# Patient Record
Sex: MALE | Race: WHITE | NOT HISPANIC OR LATINO
[De-identification: names, ages, dates, MRNs, and addresses within clinical notes are randomized per-mention and may not be internally consistent; named-entity substitution may affect disease eponyms.]

---

## 2017-03-28 ENCOUNTER — APPOINTMENT (OUTPATIENT)
Dept: TRANSPLANT | Facility: CLINIC | Age: 57
End: 2017-03-28

## 2017-03-28 ENCOUNTER — OUTPATIENT (OUTPATIENT)
Dept: OUTPATIENT SERVICES | Facility: HOSPITAL | Age: 57
LOS: 1 days | End: 2017-03-28

## 2017-03-28 ENCOUNTER — APPOINTMENT (OUTPATIENT)
Dept: NEPHROLOGY | Facility: CLINIC | Age: 57
End: 2017-03-28

## 2017-03-28 VITALS
HEIGHT: 66.25 IN | BODY MASS INDEX: 30.76 KG/M2 | DIASTOLIC BLOOD PRESSURE: 98 MMHG | SYSTOLIC BLOOD PRESSURE: 162 MMHG | RESPIRATION RATE: 18 BRPM | TEMPERATURE: 98 F | OXYGEN SATURATION: 98 % | HEART RATE: 86 BPM | WEIGHT: 191.4 LBS

## 2017-03-28 DIAGNOSIS — Z82.49 FAMILY HISTORY OF ISCHEMIC HEART DISEASE AND OTHER DISEASES OF THE CIRCULATORY SYSTEM: ICD-10-CM

## 2017-03-28 DIAGNOSIS — N18.6 END STAGE RENAL DISEASE: ICD-10-CM

## 2017-03-28 DIAGNOSIS — T56.891A TOXIC EFFECT OF OTHER METALS, ACCIDENTAL (UNINTENTIONAL), INITIAL ENCOUNTER: ICD-10-CM

## 2017-03-28 DIAGNOSIS — Z84.1 FAMILY HISTORY OF DISORDERS OF KIDNEY AND URETER: ICD-10-CM

## 2017-03-28 DIAGNOSIS — M25.519 PAIN IN UNSPECIFIED SHOULDER: ICD-10-CM

## 2017-03-28 DIAGNOSIS — E03.9 HYPOTHYROIDISM, UNSPECIFIED: ICD-10-CM

## 2017-03-28 DIAGNOSIS — Z87.81 PERSONAL HISTORY OF (HEALED) TRAUMATIC FRACTURE: ICD-10-CM

## 2017-03-28 DIAGNOSIS — Z81.8 FAMILY HISTORY OF OTHER MENTAL AND BEHAVIORAL DISORDERS: ICD-10-CM

## 2017-03-28 DIAGNOSIS — R44.3 HALLUCINATIONS, UNSPECIFIED: ICD-10-CM

## 2017-03-28 RX ORDER — AZITHROMYCIN 250 MG/1
250 TABLET, FILM COATED ORAL
Qty: 6 | Refills: 0 | Status: DISCONTINUED | COMMUNITY
Start: 2017-03-15

## 2017-03-28 RX ORDER — ARIPIPRAZOLE 20 MG/1
20 TABLET ORAL DAILY
Refills: 0 | Status: ACTIVE | COMMUNITY

## 2017-03-28 RX ORDER — CINACALCET HYDROCHLORIDE 90 MG/1
90 TABLET, COATED ORAL
Refills: 0 | Status: ACTIVE | COMMUNITY

## 2017-03-28 RX ORDER — CINACALCET HYDROCHLORIDE 60 MG/1
60 TABLET, COATED ORAL
Qty: 90 | Refills: 0 | Status: DISCONTINUED | COMMUNITY
Start: 2017-01-09

## 2017-03-28 RX ORDER — METOPROLOL SUCCINATE 50 MG/1
50 TABLET, EXTENDED RELEASE ORAL
Qty: 120 | Refills: 0 | Status: DISCONTINUED | COMMUNITY
Start: 2016-12-06

## 2017-03-28 RX ORDER — CHLORPROMAZINE HYDROCHLORIDE 50 MG/1
50 TABLET, SUGAR COATED ORAL
Qty: 630 | Refills: 0 | Status: DISCONTINUED | COMMUNITY
Start: 2016-10-07

## 2017-03-28 RX ORDER — MINERAL/VITAMIN SUPPLEMENT 66; 40; 20; 10; 10; 1.7; 1.5; 1; .3; .006 MG/1; MG/1; MG/1; MG/1; MG/1; MG/1; MG/1; MG/1; MG/1; MG/1
TABLET, COATED ORAL TWICE DAILY
Refills: 0 | Status: ACTIVE | COMMUNITY

## 2017-03-28 RX ORDER — CINACALCET HYDROCHLORIDE 30 MG/1
30 TABLET, COATED ORAL
Qty: 30 | Refills: 0 | Status: DISCONTINUED | COMMUNITY
Start: 2016-11-14

## 2017-03-28 RX ORDER — SODIUM BICARBONATE 650 MG/1
650 TABLET ORAL
Qty: 540 | Refills: 3 | Status: ACTIVE | COMMUNITY

## 2017-04-12 ENCOUNTER — APPOINTMENT (OUTPATIENT)
Dept: ULTRASOUND IMAGING | Facility: CLINIC | Age: 57
End: 2017-04-12

## 2017-04-12 ENCOUNTER — APPOINTMENT (OUTPATIENT)
Dept: RADIOLOGY | Facility: CLINIC | Age: 57
End: 2017-04-12

## 2017-04-19 ENCOUNTER — NON-APPOINTMENT (OUTPATIENT)
Age: 57
End: 2017-04-19

## 2017-04-19 ENCOUNTER — APPOINTMENT (OUTPATIENT)
Dept: CARDIOLOGY | Facility: CLINIC | Age: 57
End: 2017-04-19

## 2017-04-19 VITALS
HEIGHT: 66.25 IN | WEIGHT: 191 LBS | RESPIRATION RATE: 18 BRPM | DIASTOLIC BLOOD PRESSURE: 105 MMHG | HEART RATE: 79 BPM | BODY MASS INDEX: 30.7 KG/M2 | SYSTOLIC BLOOD PRESSURE: 172 MMHG | OXYGEN SATURATION: 98 %

## 2017-05-09 ENCOUNTER — APPOINTMENT (OUTPATIENT)
Dept: CV DIAGNOSITCS | Facility: HOSPITAL | Age: 57
End: 2017-05-09

## 2017-05-09 ENCOUNTER — OUTPATIENT (OUTPATIENT)
Dept: OUTPATIENT SERVICES | Facility: HOSPITAL | Age: 57
LOS: 1 days | End: 2017-05-09
Payer: COMMERCIAL

## 2017-05-09 ENCOUNTER — APPOINTMENT (OUTPATIENT)
Dept: CV DIAGNOSTICS | Facility: HOSPITAL | Age: 57
End: 2017-05-09

## 2017-05-09 DIAGNOSIS — N18.9 CHRONIC KIDNEY DISEASE, UNSPECIFIED: ICD-10-CM

## 2017-05-09 DIAGNOSIS — I10 ESSENTIAL (PRIMARY) HYPERTENSION: ICD-10-CM

## 2017-05-09 PROCEDURE — 93018 CV STRESS TEST I&R ONLY: CPT | Mod: GC

## 2017-05-09 PROCEDURE — 93016 CV STRESS TEST SUPVJ ONLY: CPT | Mod: GC

## 2017-05-09 PROCEDURE — 78452 HT MUSCLE IMAGE SPECT MULT: CPT | Mod: 26

## 2017-05-09 PROCEDURE — 93306 TTE W/DOPPLER COMPLETE: CPT | Mod: 26

## 2017-05-17 ENCOUNTER — APPOINTMENT (OUTPATIENT)
Dept: PSYCHIATRY | Facility: CLINIC | Age: 57
End: 2017-05-17

## 2017-07-18 ENCOUNTER — APPOINTMENT (OUTPATIENT)
Dept: ULTRASOUND IMAGING | Facility: CLINIC | Age: 57
End: 2017-07-18

## 2017-07-18 ENCOUNTER — APPOINTMENT (OUTPATIENT)
Dept: RADIOLOGY | Facility: CLINIC | Age: 57
End: 2017-07-18

## 2017-08-01 ENCOUNTER — OTHER (OUTPATIENT)
Age: 57
End: 2017-08-01

## 2017-08-02 ENCOUNTER — OTHER (OUTPATIENT)
Age: 57
End: 2017-08-02

## 2017-11-09 ENCOUNTER — CHART COPY (OUTPATIENT)
Age: 57
End: 2017-11-09

## 2017-11-09 ENCOUNTER — LABORATORY RESULT (OUTPATIENT)
Age: 57
End: 2017-11-09

## 2017-12-11 ENCOUNTER — APPOINTMENT (OUTPATIENT)
Dept: NEPHROLOGY | Facility: CLINIC | Age: 57
End: 2017-12-11
Payer: MEDICARE

## 2017-12-11 ENCOUNTER — LABORATORY RESULT (OUTPATIENT)
Age: 57
End: 2017-12-11

## 2017-12-11 VITALS — HEART RATE: 90 BPM | DIASTOLIC BLOOD PRESSURE: 98 MMHG | SYSTOLIC BLOOD PRESSURE: 170 MMHG

## 2017-12-11 VITALS — HEIGHT: 67 IN | BODY MASS INDEX: 30.61 KG/M2 | WEIGHT: 195 LBS

## 2017-12-11 DIAGNOSIS — R80.9 PROTEINURIA, UNSPECIFIED: ICD-10-CM

## 2017-12-11 DIAGNOSIS — D63.1 PERSONAL HISTORY OF DISEASES OF THE BLOOD AND BLOOD-FORMING ORGANS AND CERTAIN DISORDERS INVOLVING THE IMMUNE MECHANISM: ICD-10-CM

## 2017-12-11 DIAGNOSIS — Z86.2 PERSONAL HISTORY OF DISEASES OF THE BLOOD AND BLOOD-FORMING ORGANS AND CERTAIN DISORDERS INVOLVING THE IMMUNE MECHANISM: ICD-10-CM

## 2017-12-11 DIAGNOSIS — R04.0 EPISTAXIS: ICD-10-CM

## 2017-12-11 DIAGNOSIS — E21.3 HYPERPARATHYROIDISM, UNSPECIFIED: ICD-10-CM

## 2017-12-11 PROCEDURE — 99205 OFFICE O/P NEW HI 60 MIN: CPT | Mod: 25

## 2017-12-11 PROCEDURE — 36415 COLL VENOUS BLD VENIPUNCTURE: CPT

## 2017-12-11 RX ORDER — AMLODIPINE BESYLATE 5 MG/1
5 TABLET ORAL TWICE DAILY
Qty: 180 | Refills: 3 | Status: ACTIVE | COMMUNITY
Start: 1900-01-01 | End: 1900-01-01

## 2017-12-11 RX ORDER — CALCITRIOL 0.25 UG/1
0.25 CAPSULE, LIQUID FILLED ORAL
Qty: 180 | Refills: 3 | Status: ACTIVE | COMMUNITY

## 2017-12-13 ENCOUNTER — RX RENEWAL (OUTPATIENT)
Age: 57
End: 2017-12-13

## 2017-12-13 DIAGNOSIS — E87.5 HYPERKALEMIA: ICD-10-CM

## 2017-12-13 PROBLEM — R80.9 PROTEINURIA: Status: ACTIVE | Noted: 2017-12-13

## 2017-12-13 PROBLEM — Z86.2 HISTORY OF ANEMIA DUE TO CHRONIC KIDNEY DISEASE: Status: RESOLVED | Noted: 2017-12-11 | Resolved: 2017-12-13

## 2017-12-13 PROBLEM — R04.0 EPISTAXIS: Status: ACTIVE | Noted: 2017-12-13

## 2017-12-13 LAB
25(OH)D3 SERPL-MCNC: 19.5 NG/ML
ALBUMIN SERPL ELPH-MCNC: 3.7 G/DL
ALP BLD-CCNC: 106 U/L
ALT SERPL-CCNC: 37 U/L
ANION GAP SERPL CALC-SCNC: 18 MMOL/L
APPEARANCE: CLEAR
AST SERPL-CCNC: 24 U/L
BACTERIA: NEGATIVE
BASOPHILS # BLD AUTO: 0.05 K/UL
BASOPHILS NFR BLD AUTO: 0.8 %
BILIRUB SERPL-MCNC: 0.4 MG/DL
BILIRUBIN URINE: NEGATIVE
BLOOD URINE: ABNORMAL
BUN SERPL-MCNC: 83 MG/DL
CALCIUM SERPL-MCNC: 9.1 MG/DL
CALCIUM SERPL-MCNC: 9.1 MG/DL
CHLORIDE SERPL-SCNC: 103 MMOL/L
CHOLEST SERPL-MCNC: 168 MG/DL
CHOLEST/HDLC SERPL: 3.1 RATIO
CO2 SERPL-SCNC: 17 MMOL/L
COLOR: YELLOW
CREAT SERPL-MCNC: 5.59 MG/DL
CREAT SPEC-SCNC: 24 MG/DL
EOSINOPHIL # BLD AUTO: 0.2 K/UL
EOSINOPHIL NFR BLD AUTO: 3.2
FERRITIN SERPL-MCNC: 244 NG/ML
FOLATE SERPL-MCNC: >20 NG/ML
GLUCOSE QUALITATIVE U: NEGATIVE MG/DL
GLUCOSE SERPL-MCNC: 91 MG/DL
HBA1C MFR BLD HPLC: 4.8 %
HBV CORE IGG+IGM SER QL: NONREACTIVE
HBV SURFACE AB SER QL: NONREACTIVE
HBV SURFACE AG SER QL: NONREACTIVE
HCT VFR BLD CALC: 23.3 %
HCV AB SER QL: NONREACTIVE
HCV S/CO RATIO: 0.11 S/CO
HDLC SERPL-MCNC: 54 MG/DL
HGB BLD-MCNC: 8 G/DL
HYALINE CASTS: 0 /LPF
IRON SATN MFR SERPL: 22 %
IRON SERPL-MCNC: 47 UG/DL
KETONES URINE: NEGATIVE
LDLC SERPL CALC-MCNC: 95 MG/DL
LEUKOCYTE ESTERASE URINE: NEGATIVE
LYMPHOCYTES # BLD AUTO: 0.95 K/UL
LYMPHOCYTES NFR BLD AUTO: 15.3 %
MAN DIFF?: NORMAL
MCHC RBC-ENTMCNC: 30.2 PG
MCHC RBC-ENTMCNC: 34.3 GM/DL
MCV RBC AUTO: 87.9 FL
MICROALBUMIN 24H UR DL<=1MG/L-MCNC: 83.4 MG/DL
MICROALBUMIN/CREAT 24H UR-RTO: 3475 MG/G
MICROSCOPIC-UA: NORMAL
MONOCYTES # BLD AUTO: 0.25 K/UL
MONOCYTES NFR BLD AUTO: 4 %
NEUTROPHILS # BLD AUTO: 4.76 K/UL
NEUTROPHILS NFR BLD AUTO: 76.6 %
NITRITE URINE: NEGATIVE
PARATHYROID HORMONE INTACT: 860 PG/ML
PH URINE: 6.5
PHOSPHATE SERPL-MCNC: 5.9 MG/DL
PLATELET # BLD AUTO: 129 K/UL
POTASSIUM SERPL-SCNC: 5.5 MMOL/L
PROT SERPL-MCNC: 8.1 G/DL
PROTEIN URINE: 100 MG/DL
RBC # BLD: 2.65 M/UL
RBC # FLD: 14.3 %
RED BLOOD CELLS URINE: 1 /HPF
SODIUM SERPL-SCNC: 138 MMOL/L
SPECIFIC GRAVITY URINE: 1.01
SQUAMOUS EPITHELIAL CELLS: 0 /HPF
TIBC SERPL-MCNC: 211 UG/DL
TRIGL SERPL-MCNC: 94 MG/DL
UIBC SERPL-MCNC: 164 UG/DL
URATE SERPL-MCNC: 5.3 MG/DL
UROBILINOGEN URINE: NEGATIVE MG/DL
VIT B12 SERPL-MCNC: 1851 PG/ML
WBC # FLD AUTO: 6.21 K/UL
WHITE BLOOD CELLS URINE: 2 /HPF

## 2017-12-18 ENCOUNTER — APPOINTMENT (OUTPATIENT)
Dept: NEPHROLOGY | Facility: CLINIC | Age: 57
End: 2017-12-18
Payer: MEDICARE

## 2017-12-18 VITALS — SYSTOLIC BLOOD PRESSURE: 175 MMHG | DIASTOLIC BLOOD PRESSURE: 101 MMHG

## 2017-12-18 VITALS — DIASTOLIC BLOOD PRESSURE: 107 MMHG | SYSTOLIC BLOOD PRESSURE: 165 MMHG

## 2017-12-18 VITALS
WEIGHT: 198 LBS | OXYGEN SATURATION: 100 % | DIASTOLIC BLOOD PRESSURE: 113 MMHG | SYSTOLIC BLOOD PRESSURE: 118 MMHG | HEART RATE: 92 BPM | HEIGHT: 67 IN | BODY MASS INDEX: 31.08 KG/M2

## 2017-12-18 DIAGNOSIS — N18.9 CHRONIC KIDNEY DISEASE, UNSPECIFIED: ICD-10-CM

## 2017-12-18 PROCEDURE — 96372 THER/PROPH/DIAG INJ SC/IM: CPT

## 2017-12-18 PROCEDURE — 99214 OFFICE O/P EST MOD 30 MIN: CPT | Mod: 25

## 2017-12-18 RX ORDER — AMLODIPINE BESYLATE 10 MG/1
10 TABLET ORAL
Qty: 90 | Refills: 0 | Status: DISCONTINUED | COMMUNITY
Start: 2017-11-20

## 2017-12-18 RX ORDER — DARBEPOETIN ALFA 100 UG/ML
100 SOLUTION INTRAVENOUS; SUBCUTANEOUS
Refills: 0 | Status: COMPLETED | OUTPATIENT
Start: 2017-12-18

## 2017-12-18 RX ORDER — BENZTROPINE MESYLATE 1 MG/1
1 TABLET ORAL
Qty: 60 | Refills: 0 | Status: ACTIVE | COMMUNITY
Start: 2017-09-25

## 2017-12-18 RX ADMIN — DARBEPOETIN ALFA 0 MCG/ML: 100 SOLUTION INTRAVENOUS; SUBCUTANEOUS at 00:00

## 2017-12-19 PROBLEM — N18.9 CKD (CHRONIC KIDNEY DISEASE): Noted: 2017-03-28

## 2018-01-30 ENCOUNTER — APPOINTMENT (OUTPATIENT)
Dept: NEPHROLOGY | Facility: CLINIC | Age: 58
End: 2018-01-30
Payer: MEDICARE

## 2018-01-30 VITALS
DIASTOLIC BLOOD PRESSURE: 93 MMHG | BODY MASS INDEX: 31.55 KG/M2 | OXYGEN SATURATION: 99 % | SYSTOLIC BLOOD PRESSURE: 159 MMHG | HEART RATE: 78 BPM | WEIGHT: 201 LBS | HEIGHT: 67 IN

## 2018-01-30 VITALS — HEART RATE: 70 BPM | SYSTOLIC BLOOD PRESSURE: 150 MMHG | DIASTOLIC BLOOD PRESSURE: 84 MMHG

## 2018-01-30 DIAGNOSIS — D63.1 CHRONIC KIDNEY DISEASE, STAGE 5: ICD-10-CM

## 2018-01-30 DIAGNOSIS — D63.1 ANEMIA IN CHRONIC KIDNEY DISEASE: ICD-10-CM

## 2018-01-30 DIAGNOSIS — N18.5 CHRONIC KIDNEY DISEASE, STAGE 5: ICD-10-CM

## 2018-01-30 PROCEDURE — 99214 OFFICE O/P EST MOD 30 MIN: CPT | Mod: 25

## 2018-01-30 PROCEDURE — G0010: CPT

## 2018-01-30 PROCEDURE — 96372 THER/PROPH/DIAG INJ SC/IM: CPT

## 2018-01-30 PROCEDURE — 90740 HEPB VACC 3 DOSE IMMUNSUP IM: CPT

## 2018-01-30 RX ORDER — DARBEPOETIN ALFA 100 UG/ML
100 SOLUTION INTRAVENOUS; SUBCUTANEOUS
Refills: 0 | Status: COMPLETED | OUTPATIENT
Start: 2018-01-30

## 2018-01-30 RX ADMIN — DARBEPOETIN ALFA 0 MCG/ML: 100 SOLUTION INTRAVENOUS; SUBCUTANEOUS at 00:00

## 2018-02-06 ENCOUNTER — APPOINTMENT (OUTPATIENT)
Dept: TRANSPLANT | Facility: CLINIC | Age: 58
End: 2018-02-06

## 2018-02-08 ENCOUNTER — MEDICATION RENEWAL (OUTPATIENT)
Age: 58
End: 2018-02-08

## 2018-02-08 RX ORDER — DOXAZOSIN 8 MG/1
8 TABLET ORAL DAILY
Qty: 90 | Refills: 3 | Status: ACTIVE | COMMUNITY
Start: 1900-01-01 | End: 1900-01-01

## 2018-02-09 ENCOUNTER — INPATIENT (INPATIENT)
Facility: HOSPITAL | Age: 58
LOS: 5 days | Discharge: HOME | DRG: 683 | End: 2018-02-15
Attending: INTERNAL MEDICINE | Admitting: INTERNAL MEDICINE
Payer: MEDICARE

## 2018-02-09 VITALS
WEIGHT: 199.96 LBS | HEIGHT: 68 IN | DIASTOLIC BLOOD PRESSURE: 94 MMHG | TEMPERATURE: 98 F | OXYGEN SATURATION: 100 % | HEART RATE: 74 BPM | SYSTOLIC BLOOD PRESSURE: 154 MMHG | RESPIRATION RATE: 16 BRPM

## 2018-02-09 DIAGNOSIS — F31.9 BIPOLAR DISORDER, UNSPECIFIED: ICD-10-CM

## 2018-02-09 DIAGNOSIS — D69.6 THROMBOCYTOPENIA, UNSPECIFIED: ICD-10-CM

## 2018-02-09 DIAGNOSIS — E03.9 HYPOTHYROIDISM, UNSPECIFIED: ICD-10-CM

## 2018-02-09 DIAGNOSIS — Z29.9 ENCOUNTER FOR PROPHYLACTIC MEASURES, UNSPECIFIED: ICD-10-CM

## 2018-02-09 DIAGNOSIS — N18.9 CHRONIC KIDNEY DISEASE, UNSPECIFIED: ICD-10-CM

## 2018-02-09 DIAGNOSIS — D64.9 ANEMIA, UNSPECIFIED: ICD-10-CM

## 2018-02-09 DIAGNOSIS — R04.0 EPISTAXIS: ICD-10-CM

## 2018-02-09 DIAGNOSIS — R53.1 WEAKNESS: ICD-10-CM

## 2018-02-09 DIAGNOSIS — I10 ESSENTIAL (PRIMARY) HYPERTENSION: ICD-10-CM

## 2018-02-09 DIAGNOSIS — N18.5 CHRONIC KIDNEY DISEASE, STAGE 5: ICD-10-CM

## 2018-02-09 DIAGNOSIS — R74.0 NONSPECIFIC ELEVATION OF LEVELS OF TRANSAMINASE AND LACTIC ACID DEHYDROGENASE [LDH]: ICD-10-CM

## 2018-02-09 LAB
ALBUMIN SERPL ELPH-MCNC: 3.7 G/DL — SIGNIFICANT CHANGE UP (ref 3.3–5)
ALP SERPL-CCNC: 85 U/L — SIGNIFICANT CHANGE UP (ref 40–120)
ALT FLD-CCNC: 55 U/L RC — HIGH (ref 10–45)
ANION GAP SERPL CALC-SCNC: 18 MMOL/L — HIGH (ref 5–17)
APTT BLD: 35.8 SEC — SIGNIFICANT CHANGE UP (ref 27.5–37.4)
AST SERPL-CCNC: 51 U/L — HIGH (ref 10–40)
BASOPHILS # BLD AUTO: 0 K/UL — SIGNIFICANT CHANGE UP (ref 0–0.2)
BASOPHILS NFR BLD AUTO: 0.2 % — SIGNIFICANT CHANGE UP (ref 0–2)
BILIRUB SERPL-MCNC: 0.2 MG/DL — SIGNIFICANT CHANGE UP (ref 0.2–1.2)
BLD GP AB SCN SERPL QL: NEGATIVE — SIGNIFICANT CHANGE UP
BUN SERPL-MCNC: 100 MG/DL — HIGH (ref 7–23)
CALCIUM SERPL-MCNC: 8.5 MG/DL — SIGNIFICANT CHANGE UP (ref 8.4–10.5)
CHLORIDE SERPL-SCNC: 103 MMOL/L — SIGNIFICANT CHANGE UP (ref 96–108)
CO2 SERPL-SCNC: 17 MMOL/L — LOW (ref 22–31)
CREAT SERPL-MCNC: 7.35 MG/DL — HIGH (ref 0.5–1.3)
EOSINOPHIL # BLD AUTO: 0 K/UL — SIGNIFICANT CHANGE UP (ref 0–0.5)
EOSINOPHIL NFR BLD AUTO: 0.8 % — SIGNIFICANT CHANGE UP (ref 0–6)
GLUCOSE SERPL-MCNC: 94 MG/DL — SIGNIFICANT CHANGE UP (ref 70–99)
HCT VFR BLD CALC: 21.2 % — LOW (ref 39–50)
HCT VFR BLD CALC: 22.6 % — LOW (ref 39–50)
HGB BLD-MCNC: 7.4 G/DL — LOW (ref 13–17)
HGB BLD-MCNC: 8 G/DL — LOW (ref 13–17)
INR BLD: 1.15 RATIO — SIGNIFICANT CHANGE UP (ref 0.88–1.16)
LYMPHOCYTES # BLD AUTO: 0.8 K/UL — LOW (ref 1–3.3)
LYMPHOCYTES # BLD AUTO: 14.7 % — SIGNIFICANT CHANGE UP (ref 13–44)
MCHC RBC-ENTMCNC: 31.8 PG — SIGNIFICANT CHANGE UP (ref 27–34)
MCHC RBC-ENTMCNC: 32.5 PG — SIGNIFICANT CHANGE UP (ref 27–34)
MCHC RBC-ENTMCNC: 34.8 GM/DL — SIGNIFICANT CHANGE UP (ref 32–36)
MCHC RBC-ENTMCNC: 35.3 GM/DL — SIGNIFICANT CHANGE UP (ref 32–36)
MCV RBC AUTO: 91.5 FL — SIGNIFICANT CHANGE UP (ref 80–100)
MCV RBC AUTO: 92 FL — SIGNIFICANT CHANGE UP (ref 80–100)
MONOCYTES # BLD AUTO: 0.3 K/UL — SIGNIFICANT CHANGE UP (ref 0–0.9)
MONOCYTES NFR BLD AUTO: 4.9 % — SIGNIFICANT CHANGE UP (ref 2–14)
NEUTROPHILS # BLD AUTO: 4.1 K/UL — SIGNIFICANT CHANGE UP (ref 1.8–7.4)
NEUTROPHILS NFR BLD AUTO: 79.4 % — HIGH (ref 43–77)
PLAT MORPH BLD: NORMAL — SIGNIFICANT CHANGE UP
PLATELET # BLD AUTO: 108 K/UL — LOW (ref 150–400)
PLATELET # BLD AUTO: 88 K/UL — LOW (ref 150–400)
POTASSIUM SERPL-MCNC: 5.3 MMOL/L — SIGNIFICANT CHANGE UP (ref 3.5–5.3)
POTASSIUM SERPL-SCNC: 5.3 MMOL/L — SIGNIFICANT CHANGE UP (ref 3.5–5.3)
PROT SERPL-MCNC: 7.8 G/DL — SIGNIFICANT CHANGE UP (ref 6–8.3)
PROTHROM AB SERPL-ACNC: 12.5 SEC — SIGNIFICANT CHANGE UP (ref 9.8–12.7)
RBC # BLD: 2.31 M/UL — LOW (ref 4.2–5.8)
RBC # BLD: 2.45 M/UL — LOW (ref 4.2–5.8)
RBC # FLD: 15.1 % — HIGH (ref 10.3–14.5)
RBC # FLD: 15.6 % — HIGH (ref 10.3–14.5)
RBC BLD AUTO: NORMAL — SIGNIFICANT CHANGE UP
RH IG SCN BLD-IMP: POSITIVE — SIGNIFICANT CHANGE UP
RH IG SCN BLD-IMP: POSITIVE — SIGNIFICANT CHANGE UP
ROULEAUX BLD QL SMEAR: PRESENT — SIGNIFICANT CHANGE UP
SODIUM SERPL-SCNC: 138 MMOL/L — SIGNIFICANT CHANGE UP (ref 135–145)
WBC # BLD: 5.2 K/UL — SIGNIFICANT CHANGE UP (ref 3.8–10.5)
WBC # BLD: 5.4 K/UL — SIGNIFICANT CHANGE UP (ref 3.8–10.5)
WBC # FLD AUTO: 5.2 K/UL — SIGNIFICANT CHANGE UP (ref 3.8–10.5)
WBC # FLD AUTO: 5.4 K/UL — SIGNIFICANT CHANGE UP (ref 3.8–10.5)

## 2018-02-09 PROCEDURE — 99223 1ST HOSP IP/OBS HIGH 75: CPT

## 2018-02-09 PROCEDURE — 99222 1ST HOSP IP/OBS MODERATE 55: CPT

## 2018-02-09 PROCEDURE — 71045 X-RAY EXAM CHEST 1 VIEW: CPT | Mod: 26

## 2018-02-09 PROCEDURE — 99285 EMERGENCY DEPT VISIT HI MDM: CPT | Mod: 25,GC

## 2018-02-09 PROCEDURE — 93010 ELECTROCARDIOGRAM REPORT: CPT

## 2018-02-09 RX ORDER — CINACALCET 30 MG/1
90 TABLET, FILM COATED ORAL DAILY
Qty: 0 | Refills: 0 | Status: DISCONTINUED | OUTPATIENT
Start: 2018-02-09 | End: 2018-02-15

## 2018-02-09 RX ORDER — SODIUM CHLORIDE 9 MG/ML
3 INJECTION INTRAMUSCULAR; INTRAVENOUS; SUBCUTANEOUS EVERY 8 HOURS
Qty: 0 | Refills: 0 | Status: DISCONTINUED | OUTPATIENT
Start: 2018-02-09 | End: 2018-02-15

## 2018-02-09 RX ORDER — SODIUM BICARBONATE 1 MEQ/ML
1300 SYRINGE (ML) INTRAVENOUS
Qty: 0 | Refills: 0 | Status: DISCONTINUED | OUTPATIENT
Start: 2018-02-09 | End: 2018-02-15

## 2018-02-09 RX ORDER — DOXAZOSIN MESYLATE 4 MG
8 TABLET ORAL AT BEDTIME
Qty: 0 | Refills: 0 | Status: DISCONTINUED | OUTPATIENT
Start: 2018-02-09 | End: 2018-02-15

## 2018-02-09 RX ORDER — LEVOTHYROXINE SODIUM 125 MCG
75 TABLET ORAL DAILY
Qty: 0 | Refills: 0 | Status: DISCONTINUED | OUTPATIENT
Start: 2018-02-09 | End: 2018-02-15

## 2018-02-09 RX ORDER — LEVOTHYROXINE SODIUM 125 MCG
200 TABLET ORAL DAILY
Qty: 0 | Refills: 0 | Status: DISCONTINUED | OUTPATIENT
Start: 2018-02-09 | End: 2018-02-15

## 2018-02-09 RX ORDER — AMLODIPINE BESYLATE 2.5 MG/1
5 TABLET ORAL DAILY
Qty: 0 | Refills: 0 | Status: DISCONTINUED | OUTPATIENT
Start: 2018-02-09 | End: 2018-02-10

## 2018-02-09 RX ORDER — CHLORPROMAZINE HCL 10 MG
150 TABLET ORAL AT BEDTIME
Qty: 0 | Refills: 0 | Status: DISCONTINUED | OUTPATIENT
Start: 2018-02-09 | End: 2018-02-15

## 2018-02-09 RX ORDER — ARIPIPRAZOLE 15 MG/1
20 TABLET ORAL DAILY
Qty: 0 | Refills: 0 | Status: DISCONTINUED | OUTPATIENT
Start: 2018-02-09 | End: 2018-02-15

## 2018-02-09 RX ORDER — METOPROLOL TARTRATE 50 MG
200 TABLET ORAL DAILY
Qty: 0 | Refills: 0 | Status: DISCONTINUED | OUTPATIENT
Start: 2018-02-09 | End: 2018-02-11

## 2018-02-09 RX ORDER — ACETAMINOPHEN 500 MG
650 TABLET ORAL EVERY 6 HOURS
Qty: 0 | Refills: 0 | Status: DISCONTINUED | OUTPATIENT
Start: 2018-02-09 | End: 2018-02-15

## 2018-02-09 RX ORDER — CHLORPROMAZINE HCL 10 MG
100 TABLET ORAL
Qty: 0 | Refills: 0 | Status: DISCONTINUED | OUTPATIENT
Start: 2018-02-09 | End: 2018-02-15

## 2018-02-09 RX ORDER — CALCITRIOL 0.5 UG/1
0.25 CAPSULE ORAL DAILY
Qty: 0 | Refills: 0 | Status: DISCONTINUED | OUTPATIENT
Start: 2018-02-09 | End: 2018-02-15

## 2018-02-09 RX ADMIN — CALCITRIOL 0.25 MICROGRAM(S): 0.5 CAPSULE ORAL at 21:33

## 2018-02-09 RX ADMIN — SODIUM CHLORIDE 3 MILLILITER(S): 9 INJECTION INTRAMUSCULAR; INTRAVENOUS; SUBCUTANEOUS at 22:34

## 2018-02-09 RX ADMIN — Medication 1300 MILLIGRAM(S): at 19:49

## 2018-02-09 RX ADMIN — Medication 150 MILLIGRAM(S): at 21:34

## 2018-02-09 RX ADMIN — Medication 8 MILLIGRAM(S): at 21:34

## 2018-02-09 RX ADMIN — SODIUM CHLORIDE 3 MILLILITER(S): 9 INJECTION INTRAMUSCULAR; INTRAVENOUS; SUBCUTANEOUS at 13:17

## 2018-02-09 RX ADMIN — AMLODIPINE BESYLATE 5 MILLIGRAM(S): 2.5 TABLET ORAL at 19:49

## 2018-02-09 NOTE — H&P ADULT - NSHPLABSRESULTS_GEN_ALL_CORE
CXR (reviewed): clear lungs    Labs significant for:    hgb 7.4  plt 88     Cr 7.35,     AST 51, ALT 55

## 2018-02-09 NOTE — H&P ADULT - PROBLEM SELECTOR PLAN 7
stable on medications.  -continue chlorpromazine and abilify as dosed  -collateral from pts psychiatrist would be helpful  -check EKG for QTc

## 2018-02-09 NOTE — ED PROVIDER NOTE - PHYSICAL EXAMINATION
**ATTENDING ADDENDUM (Dr. Joel Garcia): I have reviewed and substantially contributed to the elements of the PE as documented above. I have directly performed an examination of this patient in conjunction with the other members (EM resident/PA/NP) of the patient care team. **ATTENDING ADDENDUM (Dr. oJel Garcia): I have reviewed and substantially contributed to the elements of the PE as documented above. I have directly performed an examination of this patient in conjunction with the other members (EM resident/PA/NP) of the patient care team. Of note, and in addition to the above, there are NO clinical findings consistent with acute anterior or posterior epistaxis at this time. NO indication for emergent anterior or posterior nasal packing at this time.

## 2018-02-09 NOTE — CONSULT NOTE ADULT - ASSESSMENT
57 year old male with h/o bipolar disorder, CKD stage V, HTN, hypothyroidism admitted for symptomatic anemia

## 2018-02-09 NOTE — CONSULT NOTE ADULT - PROBLEM SELECTOR RECOMMENDATION 2
In setting of renal failure and epistaxis: Hb noted to be 7.4. Patient currently getting 1 unit of PRBC for transfusion. Monitor Hb. In setting of renal failure and epistaxis: Hb noted to be 7.4. Patient currently getting 1 unit of PRBC for transfusion. Monitor Hb.  Monitor respiratory status can use IV lasix as needed for acute dyspnea.

## 2018-02-09 NOTE — ED PROVIDER NOTE - CHIEF COMPLAINT
The patient is a 57y Male complaining of The patient is a 57y Male complaining of anemia in the context of epistaxis.

## 2018-02-09 NOTE — ED PROVIDER NOTE - RESPIRATORY, MLM
Breath sounds clear and equal bilaterally. **ATTENDING ADDENDUM (Dr. Joel Garcia): NO wheezing, rales, rhonchi, crackles, stridor, drooling, retractions, nasal flaring, or tripoding.

## 2018-02-09 NOTE — ED PROVIDER NOTE - CARE PLAN
Principal Discharge DX:	Anemia Principal Discharge DX:	Anemia  Goal:	ATTENDING ADDENDUM (Dr. Joel Garcia): Goals of care include resolution of emergent/urgent symptoms and concerns, and restoration to baseline level of homeostasis.  Secondary Diagnosis:	Epistaxis  Secondary Diagnosis:	Bipolar 1 disorder

## 2018-02-09 NOTE — H&P ADULT - PROBLEM SELECTOR PLAN 3
currently no active bleeding. pt last reports bled 2 days ago.   cont to monitor closely for further bleeding currently no active bleeding. pt last reports bled 2 days ago.   cont to monitor closely for further bleeding and consider ent.

## 2018-02-09 NOTE — ED PROVIDER NOTE - OBJECTIVE STATEMENT
57 y.o. male hx of CKD. anemia, bipolar sent in from PCP for epistaxis and low H/H. Pt states has had recurrent nose bleeds every night for month. Saw ENT was cauterized but has not helped. No active bleeding at this time. No dark or black stools. No fevers. No hematuria. 57 y.o. male hx of CKD. anemia, bipolar sent in from PCP for epistaxis and low H/H. Pt states has had recurrent nose bleeds every night for month. Saw ENT was cauterized but has not helped. No active bleeding at this time. No dark or black stools. No fevers. No hematuria.  **ATTENDING ADDENDUM (Dr. Joel Garcia): I attest that I have directly and personally interviewed and examined this patient and elicited a comparable history of present illness and review of systems as documented, along with my EM resident. I attest that I have made significant contributions to the documentation where necessary and as noted in the EMR.

## 2018-02-09 NOTE — ED PROVIDER NOTE - CHPI ED SYMPTOMS POS
EPISTAXIS/**ATTENDING ADDENDUM (Dr. Joel Garcia): significant anemia as noted on outpatient laboratory tests

## 2018-02-09 NOTE — H&P ADULT - PROBLEM SELECTOR PLAN 5
unclear etiology at this time  ITP can be caused by chlorpromazine   continue to trend closely, can consider heme consult

## 2018-02-09 NOTE — H&P ADULT - NEUROLOGICAL DETAILS
cranial nerves intact/responds to pain/responds to verbal commands/normal strength/alert and oriented x 3

## 2018-02-09 NOTE — ED PROVIDER NOTE - ATTENDING CONTRIBUTION TO CARE
**ATTENDING ADDENDUM (Dr. Joel Garcia): I attest that I have directly examined this patient and reviewed and formulated the diagnostic and therapeutic management plan in collaboration with the EM resident. Please see MDM note and remainder of EMR for findings from CC, HPI, ROS, and PE. ([place])

## 2018-02-09 NOTE — ED ADULT NURSE NOTE - OBJECTIVE STATEMENT
This 57 male in ED with c/o nose bleed and low blood count This 57 male in ED with c/o nose bleed and low blood count.

## 2018-02-09 NOTE — ED PROVIDER NOTE - GASTROINTESTINAL, MLM
Abdomen soft, non-tender, non-distended. **ATTENDING ADDENDUM (Dr. Joel Garcia): NO guarding, rebound, or rigidity. NO pulsatile or non-pulsatile masses. NO hernias. NO obvious hepatosplenomegaly.

## 2018-02-09 NOTE — ED PROVIDER NOTE - PLAN OF CARE
ATTENDING ADDENDUM (Dr. Joel Garcia): Goals of care include resolution of emergent/urgent symptoms and concerns, and restoration to baseline level of homeostasis.

## 2018-02-09 NOTE — H&P ADULT - HISTORY OF PRESENT ILLNESS
58 yo M with PMH of bipolar 1, CKD (baseline Cr ~6 ),  anemia, HTN, hypothyroidism presenting with epistaxis. Pt reports for the past two months he has been having nightly nose bleeds that will last 30 mins to several hours. Last bleeding episode 2 nights ago. Pt able to get them to stop using cotton swabs and applying pressure, however he notes that it can sometimes take hours. Nose bleeds are worse with coughing. Pt reports he saw an ENT regarding this issue recently and has the bleeding cauterized, but it did not help. Additionally, pt has been experiencing worsening fatigue/generalized weakness x 2-3 months as well. Of note, pt follows closely with nephrology for anticipation of hemodialysis-- L. wrist AV fistula created 10/2017.   Pt saw his PMD recently and was sent into ED for low H/H from baseline on lab work.  Pt denies fever/chills, abdominal pain, sob, dizziness/lightheadedness, chest pain, palpitations, LE swelling, nausea/vomiting, diarrhea, hematuria, hematochezia, melena, other forms of blood loss.    VS in ED  T 97.5, P 69, /92, R 16, O2 99% RA  pt ordered for 1 unit pRBCs

## 2018-02-09 NOTE — H&P ADULT - ASSESSMENT
56 yo M with PMH of bipolar 1, CKD (baseline Cr ~6 ), anemia, HTN, hypothyroidism presenting with epistaxis and generalized weakness in setting of acute on chronic anemia and CHE on CKD. Generalized weakness likely 2/2 advancing CKD and symptomatic anemia. Pt also noted to have thrombocytopenia and mild transaminitis 56 yo M with PMH of bipolar 1, CKD (baseline Cr ~6 ), anemia, HTN, hypothyroidism presenting with epistaxis and generalized weakness in setting of acute on chronic anemia and advancing CKD. Generalized weakness likely 2/2 advancing CKD and symptomatic anemia. Pt also noted to have thrombocytopenia and mild transaminitis

## 2018-02-09 NOTE — CONSULT NOTE ADULT - SUBJECTIVE AND OBJECTIVE BOX
[facility] Division of Kidney Diseases & Hypertension  INITIAL CONSULT NOTE  [phone]--------------------------------------------------------------------------------    HPI: 58 yo M with PMH of bipolar 1, CKD stage V,  anemia, HTN, hypothyroidism presented with complains of epistaxis for the past few days. Patient was sent to hospital for blood transfusion. Nephrology was consulted for management of CKD. Patient has history of kidney disease for the past 2 years due to chronic lithium use. His nephrologist is Dr. Freya Bills. On review of previous labs in [facility], last Scr. is 5.59 on 12/11/18. Currently patient denies complains of SOB, CP, abdominal pain, nausea, vomiting, metallic taste in mouth.     PAST HISTORY  --------------------------------------------------------------------------------  PAST MEDICAL & SURGICAL HISTORY:  Hypothyroidism  Kidney failure  Renal insufficiency  HTN (hypertension)  Bipolar 1 disorder  Anemia  No significant past surgical history    FAMILY HISTORY:  No pertinent family history in first degree relatives    PAST SOCIAL HISTORY:    ALLERGIES & MEDICATIONS  --------------------------------------------------------------------------------  Allergies    No Known Allergies    Intolerances      Standing Inpatient Medications  amLODIPine   Tablet 5 milliGRAM(s) Oral daily  ARIPiprazole 20 milliGRAM(s) Oral daily  calcitriol   Capsule 0.25 MICROGram(s) Oral daily  chlorproMAZINE    Tablet 100 milliGRAM(s) Oral <User Schedule>  chlorproMAZINE    Tablet 150 milliGRAM(s) Oral at bedtime  cinacalcet 90 milliGRAM(s) Oral daily  doxazosin 8 milliGRAM(s) Oral at bedtime  levothyroxine 200 MICROGram(s) Oral daily  levothyroxine 75 MICROGram(s) Oral daily  metoprolol succinate  milliGRAM(s) Oral daily  sodium bicarbonate 1300 milliGRAM(s) Oral two times a day  sodium chloride 0.9% lock flush 3 milliLiter(s) IV Push every 8 hours    PRN Inpatient Medications  acetaminophen   Tablet. 650 milliGRAM(s) Oral every 6 hours PRN      REVIEW OF SYSTEMS  --------------------------------------------------------------------------------  Gen: No weakness  Head/Eyes/Ears/Mouth: No headache  Respiratory: No dyspnea  CV: No chest pain,   GI: No abdominal pain, nausea, vomiting  MSK: No edema  Neuro: No dizziness/lightheadedness    All other systems were reviewed and are negative, except as noted.    VITALS/PHYSICAL EXAM  --------------------------------------------------------------------------------  T(C): 36.4 (02-09-18 @ 12:31), Max: 36.6 (02-09-18 @ 10:50)  HR: 69 (02-09-18 @ 12:31) (69 - 74)  BP: 162/92 (02-09-18 @ 12:31) (154/94 - 162/92)  RR: 16 (02-09-18 @ 12:31) (16 - 16)  SpO2: 99% (02-09-18 @ 12:31) (99% - 100%)  Wt(kg): --  Height (cm): 172.72 (02-09-18 @ 10:50)  Weight (kg): 90.7 (02-09-18 @ 10:50)  BMI (kg/m2): 30.4 (02-09-18 @ 10:50)  BSA (m2): 2.04 (02-09-18 @ 10:50)      Physical Exam:  	Gen: NAD  	HEENT: sclera anicteric  	Pulm: CTA B/L  	CV:  S1S2  	Abd: +BS, soft   	Ext: No B/L Lower ext edema  	Neuro: No focal deficits  	Skin: Warm and dry     LABS/STUDIES  --------------------------------------------------------------------------------              7.4    5.2   >-----------<  88       [02-09-18 @ 13:12]              21.2     138  |  103  |  100  ----------------------------<  94      [02-09-18 @ 13:12]  5.3   |  17  |  7.35        Ca     8.5     [02-09-18 @ 13:12]    TPro  7.8  /  Alb  3.7  /  TBili  0.2  /  DBili  x   /  AST  51  /  ALT  55  /  AlkPhos  85  [02-09-18 @ 13:12]    PT/INR: PT 12.5 , INR 1.15       [02-09-18 @ 13:12]  PTT: 35.8       [02-09-18 @ 13:12]      Creatinine Trend:  SCr 7.35 [02-09 @ 13:12] [facility] Division of Kidney Diseases & Hypertension  INITIAL CONSULT NOTE  [phone]--------------------------------------------------------------------------------    HPI: 56 yo M with PMH of bipolar 1, CKD stage V,  anemia, HTN, hypothyroidism presented with complains of epistaxis for the past few days. Patient was sent to hospital for blood transfusion. Nephrology was consulted for management of CKD. Patient has history of kidney disease for the past 2 years due to chronic lithium use. His nephrologist is Dr. Freya Bills. On review of previous labs in [facility], last Scr. is 5.59 on 12/11/18. Currently patient denies complains of SOB, CP, abdominal pain, nausea, vomiting, metallic taste in mouth.     PAST HISTORY  --------------------------------------------------------------------------------  PAST MEDICAL & SURGICAL HISTORY:  Hypothyroidism  Kidney failure  Renal insufficiency  HTN (hypertension)  Bipolar 1 disorder  Anemia  No significant past surgical history    FAMILY HISTORY:  No pertinent family history in first degree relatives    PAST SOCIAL HISTORY:    ALLERGIES & MEDICATIONS  --------------------------------------------------------------------------------  Allergies    No Known Allergies    Intolerances      Standing Inpatient Medications  amLODIPine   Tablet 5 milliGRAM(s) Oral daily  ARIPiprazole 20 milliGRAM(s) Oral daily  calcitriol   Capsule 0.25 MICROGram(s) Oral daily  chlorproMAZINE    Tablet 100 milliGRAM(s) Oral <User Schedule>  chlorproMAZINE    Tablet 150 milliGRAM(s) Oral at bedtime  cinacalcet 90 milliGRAM(s) Oral daily  doxazosin 8 milliGRAM(s) Oral at bedtime  levothyroxine 200 MICROGram(s) Oral daily  levothyroxine 75 MICROGram(s) Oral daily  metoprolol succinate  milliGRAM(s) Oral daily  sodium bicarbonate 1300 milliGRAM(s) Oral two times a day  sodium chloride 0.9% lock flush 3 milliLiter(s) IV Push every 8 hours    PRN Inpatient Medications  acetaminophen   Tablet. 650 milliGRAM(s) Oral every 6 hours PRN      REVIEW OF SYSTEMS  --------------------------------------------------------------------------------  Gen: No weakness  Head/Eyes/Ears/Mouth: No headache + epistaxis  Respiratory: No dyspnea  CV: No chest pain,   GI: No abdominal pain, nausea, vomiting  MSK: No edema  Neuro: No dizziness/lightheadedness    All other systems were reviewed and are negative, except as noted.    VITALS/PHYSICAL EXAM  --------------------------------------------------------------------------------  T(C): 36.4 (02-09-18 @ 12:31), Max: 36.6 (02-09-18 @ 10:50)  HR: 69 (02-09-18 @ 12:31) (69 - 74)  BP: 162/92 (02-09-18 @ 12:31) (154/94 - 162/92)  RR: 16 (02-09-18 @ 12:31) (16 - 16)  SpO2: 99% (02-09-18 @ 12:31) (99% - 100%)  Wt(kg): --  Height (cm): 172.72 (02-09-18 @ 10:50)  Weight (kg): 90.7 (02-09-18 @ 10:50)  BMI (kg/m2): 30.4 (02-09-18 @ 10:50)  BSA (m2): 2.04 (02-09-18 @ 10:50)      Physical Exam:  	Gen: NAD  	HEENT: sclera anicteric dried blood at nares  	Pulm: CTA B/L  	CV:  S1S2 no JVD  	Abd: +BS, soft   	Ext: No B/L Lower ext edema  	Neuro: No focal deficits  	Skin: Warm and dry               Psych: flat affect    LABS/STUDIES  --------------------------------------------------------------------------------              7.4    5.2   >-----------<  88       [02-09-18 @ 13:12]              21.2     138  |  103  |  100  ----------------------------<  94      [02-09-18 @ 13:12]  5.3   |  17  |  7.35        Ca     8.5     [02-09-18 @ 13:12]    TPro  7.8  /  Alb  3.7  /  TBili  0.2  /  DBili  x   /  AST  51  /  ALT  55  /  AlkPhos  85  [02-09-18 @ 13:12]    PT/INR: PT 12.5 , INR 1.15       [02-09-18 @ 13:12]  PTT: 35.8       [02-09-18 @ 13:12]      Creatinine Trend:  SCr 7.35 [02-09 @ 13:12]

## 2018-02-09 NOTE — ED PROVIDER NOTE - MEDICAL DECISION MAKING DETAILS
**ATTENDING MEDICAL DECISION MAKING/SYNTHESIS (Dr. Joel Garcia): I have reviewed the Chief Complaint, the HPI, the ROS, and have directly performed and confirmed the findings on the Physical Examination. I have reviewed the medical decision making with all providers, as applicable. The PROBLEM REPRESENTATION at this time is: 57-year-old man with history of recent epistaxis, chronic kidney disease (on transplant list, follows with DICK Bills MD), now with asymptomatic anemia (hemoglobin and hematocrit are 6 g/dL and 19%, respectively, on outpatient labs), referred to ED for blood replacement therapy. The MOST LIKELY DIAGNOSIS, and the LIST OF DIFFERENTIAL DIAGNOSES, includes (but is not limited to) the following: hemorrhagic shock (NO evidence), anemia (known), coagulopathy, electrolyte-metabolic-endocrine derangements, dehydration, other sequela of profound anemia (e.g. ischemia, infarction, or equivalent; possible but less likely). The likelihood of each of these diagnoses has been appropriately considered in the context of this patient's presentation and my evaluation. PLAN: as described in EMR, including diagnostics, therapeutics and consultation as clinically warranted. I will continue to reevaluate the patient, including the results of all testing, and monitor response to therapy throughout the patient's course in the ED.

## 2018-02-09 NOTE — H&P ADULT - PMH
Anemia    Bipolar 1 disorder    HTN (hypertension)    Hypothyroidism    Kidney failure    Renal insufficiency

## 2018-02-09 NOTE — CONSULT NOTE ADULT - PROBLEM SELECTOR RECOMMENDATION 9
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 On review of previous labs in [facility], last Scr. is 5.59 on 12/11/18. Latest Scr. is 7.35. Patient is currently not uremic or in fluid overload. No emergent need for HD today. Continue to monitor Scr. and electrolytes. Monitor BMP daily.

## 2018-02-09 NOTE — ED PROVIDER NOTE - NS ED ROS FT
**ATTENDING ADDENDUM (Dr. Joel Garcia): Anemia    Bipolar 1 disorder    HTN (hypertension)    Kidney failure    Renal insufficiency **ATTENDING ADDENDUM (Dr. Joel Garcia): history of Anemia, Bipolar 1 disorder, HTN (hypertension), Kidney failure, and Renal insufficiency

## 2018-02-09 NOTE — H&P ADULT - PROBLEM SELECTOR PLAN 1
acute on chronic anemia (pt reports baseline hgb ~9) in setting of advancing CKD and epistaxis. symptomatic acute on chronic normocytic anemia (pt reports baseline hgb ~9) in setting of advancing CKD and epistaxis/acute blood loss. another consideration is effect of antipsychotics (chlorpromazine) however less likely as pt has been stable on these meds for a long time.  -1 unit pRBCs ordered for transfusion. Follow up CBC in AM.  -b12 , folate levels  -will call lab to try and add on iron level, ferritin from todays blood draw  -follow up renal for ?epo  -monitor for further epistaxis, may need ENT consult this admission symptomatic acute on chronic normocytic anemia (pt reports baseline hgb ~9) in setting of advancing CKD and epistaxis/acute blood loss. another consideration is effect of antipsychotics (chlorpromazine) however less likely as pt has been stable on these meds for a long time.  -1 unit pRBCs ordered for transfusion. Follow up CBC in AM.  -b12 , folate levels  -called lab but unable to add on iron/tibc to pre-transfused blood draw  -follow up renal for ?epo  -monitor for further epistaxis, may need ENT consult this admission

## 2018-02-09 NOTE — H&P ADULT - NSHPSOCIALHISTORY_GEN_ALL_CORE
lives with mom, independent in ADLs and taking own medications etc  no toxic habits, denies alcohol, tobacco

## 2018-02-09 NOTE — H&P ADULT - PROBLEM SELECTOR PLAN 4
CKD V, follows with nephrology Dr. Rogers. pt s/p AV fistula in preparation for hemodialysis. no acute indications for HD at this time.  -continue sodium bicarb 1300mg bid   -continue calcitriol, sensipar   -anemia management as above  -follow up nephrology consult  -renal diet

## 2018-02-09 NOTE — H&P ADULT - PROBLEM SELECTOR PLAN 6
may be 2/2 antipsychotics. may be helpful to obtain baseline outpatinet labwork  continue to trend while inpatient

## 2018-02-10 LAB
ALBUMIN SERPL ELPH-MCNC: 3.3 G/DL — SIGNIFICANT CHANGE UP (ref 3.3–5)
ALP SERPL-CCNC: 83 U/L — SIGNIFICANT CHANGE UP (ref 40–120)
ALT FLD-CCNC: 46 U/L — HIGH (ref 10–45)
ANION GAP SERPL CALC-SCNC: 21 MMOL/L — HIGH (ref 5–17)
AST SERPL-CCNC: 26 U/L — SIGNIFICANT CHANGE UP (ref 10–40)
BILIRUB SERPL-MCNC: 0.4 MG/DL — SIGNIFICANT CHANGE UP (ref 0.2–1.2)
BUN SERPL-MCNC: 92 MG/DL — HIGH (ref 7–23)
CALCIUM SERPL-MCNC: 8.5 MG/DL — SIGNIFICANT CHANGE UP (ref 8.4–10.5)
CHLORIDE SERPL-SCNC: 105 MMOL/L — SIGNIFICANT CHANGE UP (ref 96–108)
CO2 SERPL-SCNC: 14 MMOL/L — LOW (ref 22–31)
CREAT SERPL-MCNC: 7.56 MG/DL — HIGH (ref 0.5–1.3)
FOLATE SERPL-MCNC: >20 NG/ML — SIGNIFICANT CHANGE UP (ref 4.8–24.2)
GLUCOSE SERPL-MCNC: 82 MG/DL — SIGNIFICANT CHANGE UP (ref 70–99)
HCT VFR BLD CALC: 20.5 % — CRITICAL LOW (ref 39–50)
HCT VFR BLD CALC: 23.5 % — LOW (ref 39–50)
HGB BLD-MCNC: 7.1 G/DL — LOW (ref 13–17)
HGB BLD-MCNC: 8.2 G/DL — LOW (ref 13–17)
MAGNESIUM SERPL-MCNC: 2 MG/DL — SIGNIFICANT CHANGE UP (ref 1.6–2.6)
MCHC RBC-ENTMCNC: 30.9 PG — SIGNIFICANT CHANGE UP (ref 27–34)
MCHC RBC-ENTMCNC: 31.6 PG — SIGNIFICANT CHANGE UP (ref 27–34)
MCHC RBC-ENTMCNC: 34.6 GM/DL — SIGNIFICANT CHANGE UP (ref 32–36)
MCHC RBC-ENTMCNC: 34.9 GM/DL — SIGNIFICANT CHANGE UP (ref 32–36)
MCV RBC AUTO: 89.1 FL — SIGNIFICANT CHANGE UP (ref 80–100)
MCV RBC AUTO: 90.7 FL — SIGNIFICANT CHANGE UP (ref 80–100)
PLATELET # BLD AUTO: 78 K/UL — LOW (ref 150–400)
PLATELET # BLD AUTO: 90 K/UL — LOW (ref 150–400)
POTASSIUM SERPL-MCNC: 4.5 MMOL/L — SIGNIFICANT CHANGE UP (ref 3.5–5.3)
POTASSIUM SERPL-SCNC: 4.5 MMOL/L — SIGNIFICANT CHANGE UP (ref 3.5–5.3)
PROT SERPL-MCNC: 7.1 G/DL — SIGNIFICANT CHANGE UP (ref 6–8.3)
RBC # BLD: 2.3 M/UL — LOW (ref 4.2–5.8)
RBC # BLD: 2.59 M/UL — LOW (ref 4.2–5.8)
RBC # FLD: 14.9 % — HIGH (ref 10.3–14.5)
RBC # FLD: 16.2 % — HIGH (ref 10.3–14.5)
SODIUM SERPL-SCNC: 140 MMOL/L — SIGNIFICANT CHANGE UP (ref 135–145)
TSH SERPL-MCNC: 6.91 UIU/ML — HIGH (ref 0.27–4.2)
VIT B12 SERPL-MCNC: >2000 PG/ML — HIGH (ref 232–1245)
WBC # BLD: 5 K/UL — SIGNIFICANT CHANGE UP (ref 3.8–10.5)
WBC # BLD: 5.09 K/UL — SIGNIFICANT CHANGE UP (ref 3.8–10.5)
WBC # FLD AUTO: 5 K/UL — SIGNIFICANT CHANGE UP (ref 3.8–10.5)
WBC # FLD AUTO: 5.09 K/UL — SIGNIFICANT CHANGE UP (ref 3.8–10.5)

## 2018-02-10 PROCEDURE — 99233 SBSQ HOSP IP/OBS HIGH 50: CPT | Mod: GC

## 2018-02-10 PROCEDURE — 99223 1ST HOSP IP/OBS HIGH 75: CPT

## 2018-02-10 RX ORDER — OXYMETAZOLINE HYDROCHLORIDE 0.5 MG/ML
1 SPRAY NASAL
Qty: 0 | Refills: 0 | Status: COMPLETED | OUTPATIENT
Start: 2018-02-10 | End: 2018-02-13

## 2018-02-10 RX ORDER — HYDROMORPHONE HYDROCHLORIDE 2 MG/ML
1 INJECTION INTRAMUSCULAR; INTRAVENOUS; SUBCUTANEOUS ONCE
Qty: 0 | Refills: 0 | Status: DISCONTINUED | OUTPATIENT
Start: 2018-02-10 | End: 2018-02-15

## 2018-02-10 RX ORDER — BACITRACIN ZINC 500 UNIT/G
1 OINTMENT IN PACKET (EA) TOPICAL
Qty: 0 | Refills: 0 | Status: DISCONTINUED | OUTPATIENT
Start: 2018-02-10 | End: 2018-02-15

## 2018-02-10 RX ORDER — OXYMETAZOLINE HYDROCHLORIDE 0.5 MG/ML
1 SPRAY NASAL
Qty: 0 | Refills: 0 | Status: DISCONTINUED | OUTPATIENT
Start: 2018-02-10 | End: 2018-02-10

## 2018-02-10 RX ORDER — FLUTICASONE PROPIONATE 50 MCG
1 SPRAY, SUSPENSION NASAL
Qty: 0 | Refills: 0 | Status: DISCONTINUED | OUTPATIENT
Start: 2018-02-10 | End: 2018-02-10

## 2018-02-10 RX ORDER — SODIUM CHLORIDE 0.65 %
1 AEROSOL, SPRAY (ML) NASAL THREE TIMES A DAY
Qty: 0 | Refills: 0 | Status: DISCONTINUED | OUTPATIENT
Start: 2018-02-10 | End: 2018-02-13

## 2018-02-10 RX ORDER — HYDRALAZINE HCL 50 MG
10 TABLET ORAL THREE TIMES A DAY
Qty: 0 | Refills: 0 | Status: DISCONTINUED | OUTPATIENT
Start: 2018-02-10 | End: 2018-02-11

## 2018-02-10 RX ADMIN — Medication 100 MILLIGRAM(S): at 08:48

## 2018-02-10 RX ADMIN — OXYMETAZOLINE HYDROCHLORIDE 1 SPRAY(S): 0.5 SPRAY NASAL at 21:52

## 2018-02-10 RX ADMIN — Medication 8 MILLIGRAM(S): at 21:52

## 2018-02-10 RX ADMIN — Medication 1 APPLICATION(S): at 19:43

## 2018-02-10 RX ADMIN — Medication 10 MILLIGRAM(S): at 21:51

## 2018-02-10 RX ADMIN — CALCITRIOL 0.25 MICROGRAM(S): 0.5 CAPSULE ORAL at 12:48

## 2018-02-10 RX ADMIN — SODIUM CHLORIDE 3 MILLILITER(S): 9 INJECTION INTRAMUSCULAR; INTRAVENOUS; SUBCUTANEOUS at 21:53

## 2018-02-10 RX ADMIN — Medication 200 MILLIGRAM(S): at 05:27

## 2018-02-10 RX ADMIN — SODIUM CHLORIDE 3 MILLILITER(S): 9 INJECTION INTRAMUSCULAR; INTRAVENOUS; SUBCUTANEOUS at 15:03

## 2018-02-10 RX ADMIN — Medication 150 MILLIGRAM(S): at 21:51

## 2018-02-10 RX ADMIN — CINACALCET 90 MILLIGRAM(S): 30 TABLET, FILM COATED ORAL at 12:48

## 2018-02-10 RX ADMIN — Medication 1300 MILLIGRAM(S): at 05:27

## 2018-02-10 RX ADMIN — Medication 75 MICROGRAM(S): at 05:27

## 2018-02-10 RX ADMIN — Medication 100 MILLIGRAM(S): at 15:02

## 2018-02-10 RX ADMIN — SODIUM CHLORIDE 3 MILLILITER(S): 9 INJECTION INTRAMUSCULAR; INTRAVENOUS; SUBCUTANEOUS at 05:32

## 2018-02-10 RX ADMIN — ARIPIPRAZOLE 20 MILLIGRAM(S): 15 TABLET ORAL at 12:48

## 2018-02-10 RX ADMIN — Medication 10 MILLIGRAM(S): at 15:03

## 2018-02-10 RX ADMIN — AMLODIPINE BESYLATE 5 MILLIGRAM(S): 2.5 TABLET ORAL at 05:27

## 2018-02-10 RX ADMIN — Medication 200 MICROGRAM(S): at 05:27

## 2018-02-10 RX ADMIN — Medication 1300 MILLIGRAM(S): at 19:42

## 2018-02-10 NOTE — CONSULT NOTE ADULT - SUBJECTIVE AND OBJECTIVE BOX
[facility] Consultation    CHIEF COMPLAINT: Patient is a 57y old  Male who presents with a chief complaint of nose bleeds (09 Feb 2018 16:05)      HPI:  56 yo M with PMH of bipolar 1, CKD (baseline Cr ~6 ),  anemia, HTN, hypothyroidism presenting with epistaxis. Pt reports for the past two months he has been having nightly nose bleeds that will last 30 mins to several hours. Last bleeding episode 2 nights ago. Pt able to get them to stop using cotton swabs and applying pressure, however he notes that it can sometimes take hours. Nose bleeds are worse with coughing. Pt reports he saw an ENT regarding this issue recently and has the bleeding cauterized, but it did not help. Additionally, pt has been experiencing worsening fatigue/generalized weakness x 2-3 months as well. Of note, pt follows closely with nephrology for anticipation of hemodialysis-- L. wrist AV fistula created 10/2017.   Pt saw his PMD recently and was sent into ED for low H/H from baseline on lab work.  Pt denies fever/chills, abdominal pain, sob, dizziness/lightheadedness, chest pain, palpitations, LE swelling, nausea/vomiting, diarrhea, hematuria, hematochezia, melena, other forms of blood loss.    VS in ED  T 97.5, P 69, /92, R 16, O2 99% RA  pt ordered for 1 unit pRBCs (09 Feb 2018 16:05)      PAST MEDICAL & SURGICAL HISTORY:  Hypothyroidism  Kidney failure  Renal insufficiency  HTN (hypertension)  Bipolar 1 disorder  Anemia  No significant past surgical history      SOCIAL HISTORY: no tob/etoh    FAMILY HISTORY:   No pertinent family history in first degree relatives      MEDICATIONS  (STANDING):  ARIPiprazole 20 milliGRAM(s) Oral daily  BACItracin   Ointment 1 Application(s) Topical two times a day  calcitriol   Capsule 0.25 MICROGram(s) Oral daily  chlorproMAZINE    Tablet 100 milliGRAM(s) Oral <User Schedule>  chlorproMAZINE    Tablet 150 milliGRAM(s) Oral at bedtime  cinacalcet 90 milliGRAM(s) Oral daily  doxazosin 8 milliGRAM(s) Oral at bedtime  hydrALAZINE 10 milliGRAM(s) Oral three times a day  levothyroxine 200 MICROGram(s) Oral daily  levothyroxine 75 MICROGram(s) Oral daily  metoprolol succinate  milliGRAM(s) Oral daily  oxymetazoline 0.05% Nasal Spray 1 Spray(s) Both Nostrils two times a day  sodium bicarbonate 1300 milliGRAM(s) Oral two times a day  sodium chloride 0.9% lock flush 3 milliLiter(s) IV Push every 8 hours    MEDICATIONS  (PRN):  acetaminophen   Tablet. 650 milliGRAM(s) Oral every 6 hours PRN pain or fever  sodium chloride 0.65% Nasal 1 Spray(s) Both Nostrils three times a day PRN Nasal Congestion      Allergies    No Known Allergies      REVIEW OF SYSTEMS:    CONSTITUTIONAL: No weakness, fevers or chills  EYES: No visual changes, No diplopia  ENMT: No throat pain , No exudate; as per HPI  NECK: No pain or stiffness  RESPIRATORY: No cough, wheezing, hemoptysis; No shortness of breath  CARDIOVASCULAR: No chest pain or palpitations  GASTROINTESTINAL: No abdominal pain. No nausea, vomiting, or hematemesis; No diarrhea or constipation. No melena or hematochezia.  GENITOURINARY: No dysuria, frequency or hematuria  NEUROLOGICAL: No numbness or weakness  SKIN: No itching or rash  All other review of systems is negative unless indicated above    VITAL SIGNS:   Vital Signs Last 24 Hrs  T(C): 36.6 (10 Feb 2018 05:40), Max: 36.8 (09 Feb 2018 19:39)  T(F): 97.8 (10 Feb 2018 05:40), Max: 98.3 (09 Feb 2018 19:39)  HR: 84 (10 Feb 2018 05:40) (69 - 84)  BP: 168/83 (10 Feb 2018 05:40) (162/92 - 172/96)  BP(mean): --  RR: 18 (10 Feb 2018 05:40) (16 - 19)  SpO2: 100% (10 Feb 2018 05:40) (99% - 100%)    I&O's Summary    09 Feb 2018 07:01  -  10 Feb 2018 07:00  --------------------------------------------------------  IN: 480 mL / OUT: 0 mL / NET: 480 mL        PHYSICAL EXAM:    Constitutional: NAD, awake and alert, well-developed  Eyes:  EOMI,  Pupils round, no lesions  ENMT: no exudate or erythema; some residual blood in nose  Pulmonary: Non-labored, breath sounds are clear bilaterally, No wheezing, rales or rhonchi  Cardiovascular: PMI not palpable  Regular S1 and S2, no murmurs, rubs, gallops or clicks  Gastrointestinal: Bowel Sounds present, soft, nontender.   Lymph: No peripheral edema. No cervical lymphadenopathy.  Neurological: Alert, no focal deficits  Skin: No rashes.  No cyanosis.  Psych:  Mood & affect appropriate    LABS: All Labs Reviewed:                        7.1    5.09  )-----------( 90       ( 10 Feb 2018 09:21 )             20.5                         8.0    5.4   )-----------( 108      ( 09 Feb 2018 20:36 )             22.6                         7.4    5.2   )-----------( 88       ( 09 Feb 2018 13:12 )             21.2     10 Feb 2018 09:30    140    |  105    |  92     ----------------------------<  82     4.5     |  14     |  7.56   09 Feb 2018 13:12    138    |  103    |  100    ----------------------------<  94     5.3     |  17     |  7.35     Ca    8.5        10 Feb 2018 09:30  Ca    8.5        09 Feb 2018 13:12  Mg     2.0       10 Feb 2018 09:30    TPro  7.1    /  Alb  3.3    /  TBili  0.4    /  DBili  x      /  AST  26     /  ALT  46     /  AlkPhos  83     10 Feb 2018 09:30  TPro  7.8    /  Alb  3.7    /  TBili  0.2    /  DBili  x      /  AST  51     /  ALT  55     /  AlkPhos  85     09 Feb 2018 13:12    PT/INR - ( 09 Feb 2018 13:12 )   PT: 12.5 sec;   INR: 1.15 ratio         PTT - ( 09 Feb 2018 13:12 )  PTT:35.8 sec          02-10 @ 09:30  TSH: 6.91    ECG: SR RBBB, LVH    < from: Xray Chest 1 View AP/PA (02.09.18 @ 12:59) >    EXAM:  XR CHEST AP OR PA 1V                            PROCEDURE DATE:  02/09/2018            INTERPRETATION:  A single chest x-ray was obtained February 9, 2018.    Indication: Renal failure. Loss of breath.    Impression:    The heart is normal in size. The lungs are clear.                    ANGELO PATINO M.D., ATTENDING RADIOLOGIST  This document has been electronically signed. Feb 9 2018  1:22PM                < end of copied text >

## 2018-02-10 NOTE — PROGRESS NOTE ADULT - SUBJECTIVE AND OBJECTIVE BOX
chief complaint : nose bleed        SUBJECTIVE / OVERNIGHT EVENTS: pt denies chest pain, shortness of breath, nausea, vomiting , nose bleed+      MEDICATIONS  (STANDING):  ARIPiprazole 20 milliGRAM(s) Oral daily  BACItracin   Ointment 1 Application(s) Topical two times a day  calcitriol   Capsule 0.25 MICROGram(s) Oral daily  chlorproMAZINE    Tablet 100 milliGRAM(s) Oral <User Schedule>  chlorproMAZINE    Tablet 150 milliGRAM(s) Oral at bedtime  cinacalcet 90 milliGRAM(s) Oral daily  doxazosin 8 milliGRAM(s) Oral at bedtime  hydrALAZINE 10 milliGRAM(s) Oral three times a day  levothyroxine 200 MICROGram(s) Oral daily  levothyroxine 75 MICROGram(s) Oral daily  metoprolol succinate  milliGRAM(s) Oral daily  oxymetazoline 0.05% Nasal Spray 1 Spray(s) Both Nostrils two times a day  sodium bicarbonate 1300 milliGRAM(s) Oral two times a day  sodium chloride 0.9% lock flush 3 milliLiter(s) IV Push every 8 hours    MEDICATIONS  (PRN):  acetaminophen   Tablet. 650 milliGRAM(s) Oral every 6 hours PRN pain or fever  sodium chloride 0.65% Nasal 1 Spray(s) Both Nostrils three times a day PRN Nasal Congestion        CAPILLARY BLOOD GLUCOSE        I&O's Summary    09 Feb 2018 07:01  -  10 Feb 2018 07:00  --------------------------------------------------------  IN: 480 mL / OUT: 0 mL / NET: 480 mL    10 Feb 2018 07:01  -  10 Feb 2018 23:26  --------------------------------------------------------  IN: 1440 mL / OUT: 0 mL / NET: 1440 mL    Vital Signs Last 24 Hrs  T(C): 36.9 (10 Feb 2018 21:03), Max: 36.9 (10 Feb 2018 15:39)  T(F): 98.5 (10 Feb 2018 21:03), Max: 98.5 (10 Feb 2018 21:03)  HR: 74 (10 Feb 2018 21:03) (72 - 84)  BP: 162/90 (10 Feb 2018 21:03) (152/84 - 168/83)  BP(mean): --  RR: 18 (10 Feb 2018 21:03) (18 - 18)  SpO2: 100% (10 Feb 2018 21:03) (99% - 100%)    Constitutional: No fever, fatigue  Skin: No rash.  Eyes: No recent vision problems or eye pain.  ENT: No congestion, ear pain, or sore throat.  Cardiovascular: No chest pain or palpation.  Respiratory: No cough, shortness of breath, congestion, or wheezing.  Gastrointestinal: No abdominal pain, nausea, vomiting, or diarrhea.  Genitourinary: No dysuria.  Musculoskeletal: No joint swelling.  Neurologic: No headache.    PHYSICAL EXAM:  GENERAL: NAD  EYES: EOMI, PERRLA  karthik nostril bleeding +  NECK: Supple, No JVD  CHEST/LUNG: dec breath sounds at abses   HEART:  S1 , S2 +  ABDOMEN: soft, bs+  EXTREMITIES:  trace edema  NEUROLOGY: alert awake oriented     LABS:                        8.2    5.0   )-----------( 78       ( 10 Feb 2018 20:13 )             23.5     02-10    140  |  105  |  92<H>  ----------------------------<  82  4.5   |  14<L>  |  7.56<H>    Ca    8.5      10 Feb 2018 09:30  Mg     2.0     02-10    TPro  7.1  /  Alb  3.3  /  TBili  0.4  /  DBili  x   /  AST  26  /  ALT  46<H>  /  AlkPhos  83  02-10    PT/INR - ( 09 Feb 2018 13:12 )   PT: 12.5 sec;   INR: 1.15 ratio         PTT - ( 09 Feb 2018 13:12 )  PTT:35.8 sec          RADIOLOGY & ADDITIONAL TESTS:    Imaging Personally Reviewed:    Consultant(s) Notes Reviewed:      Care Discussed with Consultants/Other Providers:

## 2018-02-10 NOTE — CONSULT NOTE ADULT - SUBJECTIVE AND OBJECTIVE BOX
CC: recurrent Epistaxis    HPI: Patient is a 57y old  Male who presents with a chief complaint of nose bleeds (09 Feb 2018 16:05) 58 yo M with PMH of bipolar 1, CKD (baseline Cr ~6 ),  anemia, HTN, hypothyroidism presenting with epistaxis. Pt reports for the past two months he has been having nightly nose bleeds that will last 30 mins to several hours. Last bleeding episode 2 nights ago. Pt able to get them to stop using cotton swabs and applying pressure, however he notes that it can sometimes take hours. Nose bleeds are worse with coughing. Pt reports he saw an ENT regarding this issue recently and has the bleeding cauterized, but it did not help. Pt with low H/H s/p Transfusion of PRBC's x 2 last H/H 7.1/20.5 Pt admits to picking nose, and blowing nose, as he feels uncomfortable and cannot breathe. Pt actively with oozing from nostrils R>L  denies f/c/hemoptysis/cocaine use/Dysphagia/odynophagia/hemoptysis/unintentional weight loss. Any other relevant history/symptoms. +digital trauma    PAST MEDICAL & SURGICAL HISTORY:  Hypothyroidism  Kidney failure  Renal insufficiency  HTN (hypertension)  Bipolar 1 disorder  Anemia  No significant past surgical history    Allergies    No Known Allergies    Intolerances      MEDICATIONS  (STANDING):  ARIPiprazole 20 milliGRAM(s) Oral daily  BACItracin   Ointment 1 Application(s) Topical two times a day  calcitriol   Capsule 0.25 MICROGram(s) Oral daily  chlorproMAZINE    Tablet 100 milliGRAM(s) Oral <User Schedule>  chlorproMAZINE    Tablet 150 milliGRAM(s) Oral at bedtime  cinacalcet 90 milliGRAM(s) Oral daily  doxazosin 8 milliGRAM(s) Oral at bedtime  hydrALAZINE 10 milliGRAM(s) Oral three times a day  levothyroxine 200 MICROGram(s) Oral daily  levothyroxine 75 MICROGram(s) Oral daily  metoprolol succinate  milliGRAM(s) Oral daily  oxymetazoline 0.05% Nasal Spray 1 Spray(s) Both Nostrils two times a day  sodium bicarbonate 1300 milliGRAM(s) Oral two times a day  sodium chloride 0.9% lock flush 3 milliLiter(s) IV Push every 8 hours    MEDICATIONS  (PRN):  acetaminophen   Tablet. 650 milliGRAM(s) Oral every 6 hours PRN pain or fever  sodium chloride 0.65% Nasal 1 Spray(s) Both Nostrils three times a day PRN Nasal Congestion    Social History: denies recreational drug use/etoh/tobacco    ROS: ENT, GI, , CV, Pulm, Neuro, Psych, MS, Heme, Endo, Constitutional all negative except as noted in HPI    Vital Signs Last 24 Hrs  T(C): 36.9 (10 Feb 2018 15:39), Max: 36.9 (10 Feb 2018 15:39)  T(F): 98.4 (10 Feb 2018 15:39), Max: 98.4 (10 Feb 2018 15:39)  HR: 72 (10 Feb 2018 15:39) (72 - 84)  BP: 161/86 (10 Feb 2018 15:39) (161/86 - 172/96)  RR: 18 (10 Feb 2018 15:39) (18 - 19)  SpO2: 99% (10 Feb 2018 15:39) (99% - 100%)                          7.1    5.09  )-----------( 90       ( 10 Feb 2018 09:21 )             20.5    02-10    140  |  105  |  92<H>  ----------------------------<  82  4.5   |  14<L>  |  7.56<H>    Ca    8.5      10 Feb 2018 09:30  Mg     2.0     02-10    TPro  7.1  /  Alb  3.3  /  TBili  0.4  /  DBili  x   /  AST  26  /  ALT  46<H>  /  AlkPhos  83  02-10   PT/INR - ( 09 Feb 2018 13:12 )   PT: 12.5 sec;   INR: 1.15 ratio         PTT - ( 09 Feb 2018 13:12 )  PTT:35.8 sec    PHYSICAL EXAM:  Gen: NAD, well-developed  Head: Normocephalic, Atraumatic  Face: no edema/erythema/fluctuance, parotid glands soft without mass  Nose: Nares bilaterally patent, with oozing, Cauterized B/L nares, surgicel palced in Right Nostril Baci applied b/l nostrils no discharge  Mouth: Mucosa moist, tongue/uvula midline, oropharynx clear  Neck: Flat, supple, no lymphadenopathy, trachea midline, no masses  Resp: breathing easily, no stridor

## 2018-02-10 NOTE — PROGRESS NOTE ADULT - ATTENDING COMMENTS
CKD5: no acute indication for dialysis  Anemia: agree for further transfusion today  Epistaxis: ENT followup; if not improving can give DDAVP to help with bleeding  HTN: control BP with increased hydralazine if needed CKD5: no acute indication for dialysis  Anemia: agree for further transfusion today  Epistaxis: ENT followup; if not improving can give DDAVP to help with bleeding  HTN: control BP. Restarted amlodipine  Can increase hydralazine if needed

## 2018-02-10 NOTE — CONSULT NOTE ADULT - PROBLEM SELECTOR RECOMMENDATION 9
Nasal Saline b/l 3x/day  Bacitracin ointment B/L 2x/day  Avoid nasal trauma ( discussed at length with pt, and family)  Avoid straining/cough & sneeze with mouth open  Monitor H/H, transfuse prn  HOB, elevation  BP control  Care per primary team

## 2018-02-10 NOTE — CONSULT NOTE ADULT - ASSESSMENT
57M with epistaxis now stopped. To receive 1 more unit RBCs. No evidence for active cardiac issues.  Stree nuclear exam and echo normal 2017    Recommend:  - Proceed with RBCs  - Renal f/u  - ENT eval  - no further cardiac eval needed at present

## 2018-02-10 NOTE — PROGRESS NOTE ADULT - SUBJECTIVE AND OBJECTIVE BOX
[facility] DIVISION OF KIDNEY DISEASES AND HYPERTENSION --    24 hour events/subjective:    still having nosebleeds    PAST HISTORY  --------------------------------------------------------------------------------  No significant changes to PMH, PSH, FHx, SHx, unless otherwise noted    ALLERGIES & MEDICATIONS  --------------------------------------------------------------------------------  Allergies    No Known Allergies    Intolerances      Standing Inpatient Medications  ARIPiprazole 20 milliGRAM(s) Oral daily  BACItracin   Ointment 1 Application(s) Topical two times a day  calcitriol   Capsule 0.25 MICROGram(s) Oral daily  chlorproMAZINE    Tablet 100 milliGRAM(s) Oral <User Schedule>  chlorproMAZINE    Tablet 150 milliGRAM(s) Oral at bedtime  cinacalcet 90 milliGRAM(s) Oral daily  doxazosin 8 milliGRAM(s) Oral at bedtime  hydrALAZINE 10 milliGRAM(s) Oral three times a day  HYDROmorphone  Injectable 1 milliGRAM(s) IV Push once  levothyroxine 200 MICROGram(s) Oral daily  levothyroxine 75 MICROGram(s) Oral daily  metoprolol succinate  milliGRAM(s) Oral daily  oxymetazoline 0.05% Nasal Spray 1 Spray(s) Both Nostrils two times a day  sodium bicarbonate 1300 milliGRAM(s) Oral two times a day  sodium chloride 0.9% lock flush 3 milliLiter(s) IV Push every 8 hours    PRN Inpatient Medications  acetaminophen   Tablet. 650 milliGRAM(s) Oral every 6 hours PRN  sodium chloride 0.65% Nasal 1 Spray(s) Both Nostrils three times a day PRN      REVIEW OF SYSTEMS  --------------------------------------------------------------------------------    All other systems were reviewed and are negative, except as noted.    VITALS/PHYSICAL EXAM  --------------------------------------------------------------------------------  T(C): 36.9 (02-10-18 @ 21:03), Max: 36.9 (02-10-18 @ 15:39)  HR: 74 (02-10-18 @ 21:03) (72 - 84)  BP: 162/90 (02-10-18 @ 21:03) (152/84 - 168/83)  RR: 18 (02-10-18 @ 21:03) (18 - 18)  SpO2: 100% (02-10-18 @ 21:03) (99% - 100%)  Wt(kg): --  Height (cm): 172.72 (02-09-18 @ 19:39)  Weight (kg): 89.7 (02-09-18 @ 19:39)  BMI (kg/m2): 30.1 (02-09-18 @ 19:39)  BSA (m2): 2.03 (02-09-18 @ 19:39)      02-09-18 @ 07:01  -  02-10-18 @ 07:00  --------------------------------------------------------  IN: 480 mL / OUT: 0 mL / NET: 480 mL    02-10-18 @ 07:01  -  02-10-18 @ 23:53  --------------------------------------------------------  IN: 1440 mL / OUT: 0 mL / NET: 1440 mL      Physical Exam:  	Gen: NAD,   	HEENT: PERRL, nose dry blood  	Pulm: CTA B/L  	CV: RRR, S1S2; no rub  	Back: No spinal or CVA tenderness; no sacral edema  	Abd: +BS, soft, nontender/nondistended  	: No suprapubic tenderness  	LE: Warm, FROM, no clubbing, intact strength; trace edema  	Skin: Warm, without rashes  	Vascular access: left AVF good thrill    LABS/STUDIES  --------------------------------------------------------------------------------              8.2    5.0   >-----------<  78       [02-10-18 @ 20:13]              23.5     140  |  105  |  92  ----------------------------<  82      [02-10-18 @ 09:30]  4.5   |  14  |  7.56        Ca     8.5     [02-10-18 @ 09:30]      Mg     2.0     [02-10-18 @ 09:30]    TPro  7.1  /  Alb  3.3  /  TBili  0.4  /  DBili  x   /  AST  26  /  ALT  46  /  AlkPhos  83  [02-10-18 @ 09:30]    PT/INR: PT 12.5 , INR 1.15       [02-09-18 @ 13:12]  PTT: 35.8       [02-09-18 @ 13:12]      Creatinine Trend:  SCr 7.56 [02-10 @ 09:30]  SCr 7.35 [02-09 @ 13:12]        TSH 6.91      [02-10-18 @ 09:30]

## 2018-02-11 LAB
ANION GAP SERPL CALC-SCNC: 19 MMOL/L — HIGH (ref 5–17)
BUN SERPL-MCNC: 97 MG/DL — HIGH (ref 7–23)
CALCIUM SERPL-MCNC: 8.6 MG/DL — SIGNIFICANT CHANGE UP (ref 8.4–10.5)
CHLORIDE SERPL-SCNC: 111 MMOL/L — HIGH (ref 96–108)
CO2 SERPL-SCNC: 15 MMOL/L — LOW (ref 22–31)
CREAT SERPL-MCNC: 7.99 MG/DL — HIGH (ref 0.5–1.3)
GLUCOSE SERPL-MCNC: 93 MG/DL — SIGNIFICANT CHANGE UP (ref 70–99)
HCT VFR BLD CALC: 21.2 % — LOW (ref 39–50)
HCT VFR BLD CALC: 21.4 % — LOW (ref 39–50)
HGB BLD-MCNC: 7.2 G/DL — LOW (ref 13–17)
HGB BLD-MCNC: 7.7 G/DL — LOW (ref 13–17)
MCHC RBC-ENTMCNC: 29.9 PG — SIGNIFICANT CHANGE UP (ref 27–34)
MCHC RBC-ENTMCNC: 33.2 PG — SIGNIFICANT CHANGE UP (ref 27–34)
MCHC RBC-ENTMCNC: 33.6 GM/DL — SIGNIFICANT CHANGE UP (ref 32–36)
MCHC RBC-ENTMCNC: 36.4 GM/DL — HIGH (ref 32–36)
MCV RBC AUTO: 88.8 FL — SIGNIFICANT CHANGE UP (ref 80–100)
MCV RBC AUTO: 91.1 FL — SIGNIFICANT CHANGE UP (ref 80–100)
PLATELET # BLD AUTO: 79 K/UL — LOW (ref 150–400)
PLATELET # BLD AUTO: 80 K/UL — LOW (ref 150–400)
POTASSIUM SERPL-MCNC: 4.8 MMOL/L — SIGNIFICANT CHANGE UP (ref 3.5–5.3)
POTASSIUM SERPL-SCNC: 4.8 MMOL/L — SIGNIFICANT CHANGE UP (ref 3.5–5.3)
RBC # BLD: 2.32 M/UL — LOW (ref 4.2–5.8)
RBC # BLD: 2.41 M/UL — LOW (ref 4.2–5.8)
RBC # FLD: 14.9 % — HIGH (ref 10.3–14.5)
RBC # FLD: 15.9 % — HIGH (ref 10.3–14.5)
SODIUM SERPL-SCNC: 145 MMOL/L — SIGNIFICANT CHANGE UP (ref 135–145)
WBC # BLD: 4.9 K/UL — SIGNIFICANT CHANGE UP (ref 3.8–10.5)
WBC # BLD: 5.02 K/UL — SIGNIFICANT CHANGE UP (ref 3.8–10.5)
WBC # FLD AUTO: 4.9 K/UL — SIGNIFICANT CHANGE UP (ref 3.8–10.5)
WBC # FLD AUTO: 5.02 K/UL — SIGNIFICANT CHANGE UP (ref 3.8–10.5)

## 2018-02-11 PROCEDURE — 99232 SBSQ HOSP IP/OBS MODERATE 35: CPT

## 2018-02-11 PROCEDURE — 93010 ELECTROCARDIOGRAM REPORT: CPT

## 2018-02-11 PROCEDURE — 99233 SBSQ HOSP IP/OBS HIGH 50: CPT | Mod: GC

## 2018-02-11 RX ORDER — HYDRALAZINE HCL 50 MG
50 TABLET ORAL THREE TIMES A DAY
Qty: 0 | Refills: 0 | Status: DISCONTINUED | OUTPATIENT
Start: 2018-02-11 | End: 2018-02-11

## 2018-02-11 RX ORDER — AMLODIPINE BESYLATE 2.5 MG/1
5 TABLET ORAL DAILY
Qty: 0 | Refills: 0 | Status: DISCONTINUED | OUTPATIENT
Start: 2018-02-11 | End: 2018-02-11

## 2018-02-11 RX ORDER — LABETALOL HCL 100 MG
100 TABLET ORAL
Qty: 0 | Refills: 0 | Status: DISCONTINUED | OUTPATIENT
Start: 2018-02-11 | End: 2018-02-11

## 2018-02-11 RX ORDER — HYDRALAZINE HCL 50 MG
25 TABLET ORAL THREE TIMES A DAY
Qty: 0 | Refills: 0 | Status: DISCONTINUED | OUTPATIENT
Start: 2018-02-11 | End: 2018-02-14

## 2018-02-11 RX ORDER — HYDRALAZINE HCL 50 MG
25 TABLET ORAL THREE TIMES A DAY
Qty: 0 | Refills: 0 | Status: DISCONTINUED | OUTPATIENT
Start: 2018-02-11 | End: 2018-02-11

## 2018-02-11 RX ORDER — AMLODIPINE BESYLATE 2.5 MG/1
5 TABLET ORAL
Qty: 0 | Refills: 0 | Status: DISCONTINUED | OUTPATIENT
Start: 2018-02-11 | End: 2018-02-12

## 2018-02-11 RX ORDER — DESMOPRESSIN ACETATE 0.1 MG/1
15 TABLET ORAL ONCE
Qty: 0 | Refills: 0 | Status: COMPLETED | OUTPATIENT
Start: 2018-02-11 | End: 2018-02-11

## 2018-02-11 RX ORDER — FUROSEMIDE 40 MG
40 TABLET ORAL DAILY
Qty: 0 | Refills: 0 | Status: DISCONTINUED | OUTPATIENT
Start: 2018-02-11 | End: 2018-02-15

## 2018-02-11 RX ADMIN — Medication 1 APPLICATION(S): at 17:09

## 2018-02-11 RX ADMIN — SODIUM CHLORIDE 3 MILLILITER(S): 9 INJECTION INTRAMUSCULAR; INTRAVENOUS; SUBCUTANEOUS at 13:18

## 2018-02-11 RX ADMIN — Medication 8 MILLIGRAM(S): at 22:31

## 2018-02-11 RX ADMIN — Medication 100 MILLIGRAM(S): at 13:22

## 2018-02-11 RX ADMIN — Medication 75 MICROGRAM(S): at 04:46

## 2018-02-11 RX ADMIN — CALCITRIOL 0.25 MICROGRAM(S): 0.5 CAPSULE ORAL at 11:42

## 2018-02-11 RX ADMIN — Medication 100 MILLIGRAM(S): at 08:36

## 2018-02-11 RX ADMIN — OXYMETAZOLINE HYDROCHLORIDE 1 SPRAY(S): 0.5 SPRAY NASAL at 17:10

## 2018-02-11 RX ADMIN — SODIUM CHLORIDE 3 MILLILITER(S): 9 INJECTION INTRAMUSCULAR; INTRAVENOUS; SUBCUTANEOUS at 08:30

## 2018-02-11 RX ADMIN — OXYMETAZOLINE HYDROCHLORIDE 1 SPRAY(S): 0.5 SPRAY NASAL at 04:47

## 2018-02-11 RX ADMIN — Medication 40 MILLIGRAM(S): at 13:27

## 2018-02-11 RX ADMIN — AMLODIPINE BESYLATE 5 MILLIGRAM(S): 2.5 TABLET ORAL at 04:46

## 2018-02-11 RX ADMIN — CINACALCET 90 MILLIGRAM(S): 30 TABLET, FILM COATED ORAL at 11:42

## 2018-02-11 RX ADMIN — AMLODIPINE BESYLATE 5 MILLIGRAM(S): 2.5 TABLET ORAL at 17:10

## 2018-02-11 RX ADMIN — Medication 1300 MILLIGRAM(S): at 17:09

## 2018-02-11 RX ADMIN — Medication 150 MILLIGRAM(S): at 22:31

## 2018-02-11 RX ADMIN — Medication 25 MILLIGRAM(S): at 22:31

## 2018-02-11 RX ADMIN — Medication 200 MILLIGRAM(S): at 04:45

## 2018-02-11 RX ADMIN — Medication 1 APPLICATION(S): at 04:46

## 2018-02-11 RX ADMIN — Medication 1300 MILLIGRAM(S): at 04:45

## 2018-02-11 RX ADMIN — Medication 10 MILLIGRAM(S): at 04:46

## 2018-02-11 RX ADMIN — Medication 100 MILLIGRAM(S): at 17:09

## 2018-02-11 RX ADMIN — SODIUM CHLORIDE 3 MILLILITER(S): 9 INJECTION INTRAMUSCULAR; INTRAVENOUS; SUBCUTANEOUS at 22:34

## 2018-02-11 RX ADMIN — Medication 200 MICROGRAM(S): at 04:46

## 2018-02-11 RX ADMIN — Medication 25 MILLIGRAM(S): at 13:22

## 2018-02-11 RX ADMIN — ARIPIPRAZOLE 20 MILLIGRAM(S): 15 TABLET ORAL at 11:41

## 2018-02-11 RX ADMIN — DESMOPRESSIN ACETATE 215 MICROGRAM(S): 0.1 TABLET ORAL at 13:22

## 2018-02-11 NOTE — CHART NOTE - NSCHARTNOTEFT_GEN_A_CORE
Walked into room, wiping blood from left nostril, examined pt, noted with dripping of minimal to moderate amount of epitaxies. Pt had epitaxies during the day shift, s/p eval from ENT PA,  and 1 unit PRBC given. Pt denies digital manipulation to nose as previously noted on chart. Pt denies any dizziness, coughing, sneezing, blowing his nose frequently.    Vital Signs Last 24 Hrs  T(C): 36.9 (10 Feb 2018 21:03), Max: 36.9 (10 Feb 2018 15:39)  T(F): 98.5 (10 Feb 2018 21:03), Max: 98.5 (10 Feb 2018 21:03)  HR: 74 (10 Feb 2018 21:03) (72 - 84)  BP: 162/90 (10 Feb 2018 21:03) (152/84 - 168/83)  BP(mean): --  RR: 18 (10 Feb 2018 21:03) (18 - 18)  SpO2: 100% (10 Feb 2018 21:03) (99% - 100%)                          8.2    5.0   )-----------( 78       ( 10 Feb 2018 20:13 )             23.5   ( post transfusion 1 unit PRBC)    Left nostril C/D/I  Right nostril noted with drips of sanguinous drainage    A/P: 57 y.o. male hx of CKD 5 . anemia, bipolar sent in from PCP for epistaxis and low H/H. Pt states has had recurrent nose bleeds every night for month. Saw ENT outpatient was cauterized to left nostril, but epistaxis continued. Pt with episode 2/10/18 in AM and in PM     - Epitaxis - stopped after ice pack and gauze applied for about 25 mins , Bacitracin applied after  ENT PA Marilynn notified of event, informed to call back if pt has active bleeding.  - No CBC necessary at this time as per PA, will continue to monitor  f/u CBC in am    Rick Carreno NP  [id number]

## 2018-02-11 NOTE — PROGRESS NOTE ADULT - SUBJECTIVE AND OBJECTIVE BOX
[facility] DIVISION OF KIDNEY DISEASES AND HYPERTENSION --    24 hour events/subjective:    Still with epistaxis.     PAST HISTORY  --------------------------------------------------------------------------------  No significant changes to PMH, PSH, FHx, SHx, unless otherwise noted    ALLERGIES & MEDICATIONS  --------------------------------------------------------------------------------  Allergies    No Known Allergies    Intolerances      Standing Inpatient Medications  amLODIPine   Tablet 5 milliGRAM(s) Oral daily  ARIPiprazole 20 milliGRAM(s) Oral daily  BACItracin   Ointment 1 Application(s) Topical two times a day  calcitriol   Capsule 0.25 MICROGram(s) Oral daily  chlorproMAZINE    Tablet 100 milliGRAM(s) Oral <User Schedule>  chlorproMAZINE    Tablet 150 milliGRAM(s) Oral at bedtime  cinacalcet 90 milliGRAM(s) Oral daily  desmopressin IVPB 15 MICROGram(s) IV Intermittent once  doxazosin 8 milliGRAM(s) Oral at bedtime  furosemide    Tablet 40 milliGRAM(s) Oral daily  hydrALAZINE 25 milliGRAM(s) Oral three times a day  HYDROmorphone  Injectable 1 milliGRAM(s) IV Push once  levothyroxine 200 MICROGram(s) Oral daily  levothyroxine 75 MICROGram(s) Oral daily  metoprolol succinate  milliGRAM(s) Oral daily  oxymetazoline 0.05% Nasal Spray 1 Spray(s) Both Nostrils two times a day  sodium bicarbonate 1300 milliGRAM(s) Oral two times a day  sodium chloride 0.9% lock flush 3 milliLiter(s) IV Push every 8 hours    PRN Inpatient Medications  acetaminophen   Tablet. 650 milliGRAM(s) Oral every 6 hours PRN  sodium chloride 0.65% Nasal 1 Spray(s) Both Nostrils three times a day PRN      REVIEW OF SYSTEMS  --------------------------------------------------------------------------------  Gen: No weight changes, fatigue, fevers/chills, weakness  Skin: No rashes  Respiratory: No dyspnea, cough, wheezing, hemoptysis  CV: No chest pain, PND, orthopnea  GI: No abdominal pain, diarrhea, constipation, nausea, vomiting, melena, hematochezia  : No increased frequency, dysuria, hematuria, nocturia  Neuro: No dizziness/lightheadedness    All other systems were reviewed and are negative, except as noted.    VITALS/PHYSICAL EXAM  --------------------------------------------------------------------------------  T(C): 36.2 (02-11-18 @ 04:00), Max: 36.9 (02-10-18 @ 15:39)  HR: 76 (02-11-18 @ 04:00) (72 - 82)  BP: 180/95 (02-11-18 @ 04:00) (152/84 - 180/95)  RR: 18 (02-11-18 @ 04:00) (18 - 18)  SpO2: 98% (02-11-18 @ 04:00) (98% - 100%)  Wt(kg): --  Height (cm): 172.72 (02-09-18 @ 19:39)  Weight (kg): 89.7 (02-09-18 @ 19:39)  BMI (kg/m2): 30.1 (02-09-18 @ 19:39)  BSA (m2): 2.03 (02-09-18 @ 19:39)      02-10-18 @ 07:01 - 02-11-18 @ 07:00  --------------------------------------------------------  IN: 1440 mL / OUT: 0 mL / NET: 1440 mL      Physical Exam:  	Gen: NAD, well-appearing  	HEENT: PERRL, supple neck, clear oropharynx  	Pulm: CTA B/L  	CV: RRR, S1S2; no rub  	Back: No spinal or CVA tenderness; no sacral edema  	Abd: +BS, soft, nontender/nondistended  	: No suprapubic tenderness  	LE: Warm, FROM, no clubbing, intact strength; no edema  	Skin: Warm, without rashes  	Vascular access:    LABS/STUDIES  --------------------------------------------------------------------------------              7.7    4.9   >-----------<  79       [02-11-18 @ 06:45]              21.2     145  |  111  |  97  ----------------------------<  93      [02-11-18 @ 08:39]  4.8   |  15  |  7.99        Ca     8.6     [02-11-18 @ 08:39]      Mg     2.0     [02-10-18 @ 09:30]    TPro  7.1  /  Alb  3.3  /  TBili  0.4  /  DBili  x   /  AST  26  /  ALT  46  /  AlkPhos  83  [02-10-18 @ 09:30]    PT/INR: PT 12.5 , INR 1.15       [02-09-18 @ 13:12]  PTT: 35.8       [02-09-18 @ 13:12]      Creatinine Trend:  SCr 7.99 [02-11 @ 08:39]  SCr 7.56 [02-10 @ 09:30]  SCr 7.35 [02-09 @ 13:12]        TSH 6.91      [02-10-18 @ 09:30]

## 2018-02-11 NOTE — PROGRESS NOTE ADULT - SUBJECTIVE AND OBJECTIVE BOX
chief complaint : nose bleed        SUBJECTIVE / OVERNIGHT EVENTS: pt denies chest pain, shortness of breath, nausea, vomiting , nose bleed+    MEDICATIONS  (STANDING):  amLODIPine   Tablet 5 milliGRAM(s) Oral two times a day  ARIPiprazole 20 milliGRAM(s) Oral daily  BACItracin   Ointment 1 Application(s) Topical two times a day  calcitriol   Capsule 0.25 MICROGram(s) Oral daily  chlorproMAZINE    Tablet 100 milliGRAM(s) Oral <User Schedule>  chlorproMAZINE    Tablet 150 milliGRAM(s) Oral at bedtime  cinacalcet 90 milliGRAM(s) Oral daily  doxazosin 8 milliGRAM(s) Oral at bedtime  furosemide    Tablet 40 milliGRAM(s) Oral daily  HYDROmorphone  Injectable 1 milliGRAM(s) IV Push once  labetalol 100 milliGRAM(s) Oral two times a day  levothyroxine 200 MICROGram(s) Oral daily  levothyroxine 75 MICROGram(s) Oral daily  oxymetazoline 0.05% Nasal Spray 1 Spray(s) Both Nostrils two times a day  sodium bicarbonate 1300 milliGRAM(s) Oral two times a day  sodium chloride 0.9% lock flush 3 milliLiter(s) IV Push every 8 hours    MEDICATIONS  (PRN):  acetaminophen   Tablet. 650 milliGRAM(s) Oral every 6 hours PRN pain or fever  sodium chloride 0.65% Nasal 1 Spray(s) Both Nostrils three times a day PRN Nasal Congestion      Vital Signs Last 24 Hrs  T(C): 36.6 (11 Feb 2018 20:40), Max: 36.7 (11 Feb 2018 14:54)  T(F): 97.9 (11 Feb 2018 20:40), Max: 98.1 (11 Feb 2018 14:54)  HR: 75 (11 Feb 2018 20:40) (73 - 76)  BP: 156/92 (11 Feb 2018 20:40) (148/82 - 180/95)  BP(mean): --  RR: 18 (11 Feb 2018 20:40) (18 - 18)  SpO2: 100% (11 Feb 2018 20:40) (98% - 100%)    Constitutional: No fever, fatigue  Skin: No rash.  Eyes: No recent vision problems or eye pain.  ENT: No congestion, ear pain, or sore throat.  Cardiovascular: No chest pain or palpation.  Respiratory: No cough, shortness of breath, congestion, or wheezing.  Gastrointestinal: No abdominal pain, nausea, vomiting, or diarrhea.  Genitourinary: No dysuria.  Musculoskeletal: No joint swelling.  Neurologic: No headache.    PHYSICAL EXAM:  GENERAL: NAD  EYES: EOMI, PERRLA  karthik nostril bleeding +  NECK: Supple, No JVD  CHEST/LUNG: dec breath sounds at abses   HEART:  S1 , S2 +  ABDOMEN: soft, bs+  EXTREMITIES:  trace edema  NEUROLOGY: alert awake oriented     LABS:  02-11    145  |  111<H>  |  97<H>  ----------------------------<  93  4.8   |  15<L>  |  7.99<H>    Ca    8.6      11 Feb 2018 08:39  Mg     2.0     02-10    TPro  7.1  /  Alb  3.3  /  TBili  0.4  /  DBili      /  AST  26  /  ALT  46<H>  /  AlkPhos  83  02-10    Creatinine Trend: 7.99 <--, 7.56 <--, 7.35 <--                        7.7    4.9   )-----------( 79       ( 11 Feb 2018 06:45 )             21.2     Urine Studies:            LIVER FUNCTIONS - ( 10 Feb 2018 09:30 )  Alb: 3.3 g/dL / Pro: 7.1 g/dL / ALK PHOS: 83 U/L / ALT: 46 U/L / AST: 26 U/L / GGT: x

## 2018-02-11 NOTE — PROGRESS NOTE ADULT - SUBJECTIVE AND OBJECTIVE BOX
Follow up: cv risk    HPI:  56 yo M with PMH of bipolar 1, CKD (baseline Cr ~6 ),  anemia, HTN, hypothyroidism presenting with epistaxis. Pt reports for the past two months he has been having nightly nose bleeds that will last 30 mins to several hours. Last bleeding episode 2 nights ago. Pt able to get them to stop using cotton swabs and applying pressure, however he notes that it can sometimes take hours. Nose bleeds are worse with coughing. Pt reports he saw an ENT regarding this issue recently and has the bleeding cauterized, but it did not help. Additionally, pt has been experiencing worsening fatigue/generalized weakness x 2-3 months as well. Of note, pt follows closely with nephrology for anticipation of hemodialysis-- L. wrist AV fistula created 10/2017.   Pt saw his PMD recently and was sent into ED for low H/H from baseline on lab work.  Pt denies fever/chills, abdominal pain, sob, dizziness/lightheadedness, chest pain, palpitations, LE swelling, nausea/vomiting, diarrhea, hematuria, hematochezia, melena, other forms of blood loss.    VS in ED  T 97.5, P 69, /92, R 16, O2 99% RA  pt ordered for 1 unit pRBCs (09 Feb 2018 16:05)      PAST MEDICAL & SURGICAL HISTORY:  Hypothyroidism  Kidney failure  Renal insufficiency  HTN (hypertension)  Bipolar 1 disorder  Anemia  No significant past surgical history      MEDICATIONS  (STANDING):  amLODIPine   Tablet 5 milliGRAM(s) Oral daily  ARIPiprazole 20 milliGRAM(s) Oral daily  BACItracin   Ointment 1 Application(s) Topical two times a day  calcitriol   Capsule 0.25 MICROGram(s) Oral daily  chlorproMAZINE    Tablet 100 milliGRAM(s) Oral <User Schedule>  chlorproMAZINE    Tablet 150 milliGRAM(s) Oral at bedtime  cinacalcet 90 milliGRAM(s) Oral daily  doxazosin 8 milliGRAM(s) Oral at bedtime  hydrALAZINE 10 milliGRAM(s) Oral three times a day  HYDROmorphone  Injectable 1 milliGRAM(s) IV Push once  levothyroxine 200 MICROGram(s) Oral daily  levothyroxine 75 MICROGram(s) Oral daily  metoprolol succinate  milliGRAM(s) Oral daily  oxymetazoline 0.05% Nasal Spray 1 Spray(s) Both Nostrils two times a day  sodium bicarbonate 1300 milliGRAM(s) Oral two times a day  sodium chloride 0.9% lock flush 3 milliLiter(s) IV Push every 8 hours    MEDICATIONS  (PRN):  acetaminophen   Tablet. 650 milliGRAM(s) Oral every 6 hours PRN pain or fever  sodium chloride 0.65% Nasal 1 Spray(s) Both Nostrils three times a day PRN Nasal Congestion      Vital Signs Last 24 Hrs  T(C): 36.2 (11 Feb 2018 04:00), Max: 36.9 (10 Feb 2018 15:39)  T(F): 97.2 (11 Feb 2018 04:00), Max: 98.5 (10 Feb 2018 21:03)  HR: 76 (11 Feb 2018 04:00) (72 - 82)  BP: 180/95 (11 Feb 2018 04:00) (152/84 - 180/95)  BP(mean): --  RR: 18 (11 Feb 2018 04:00) (18 - 18)  SpO2: 98% (11 Feb 2018 04:00) (98% - 100%)    I&O's Summary    10 Feb 2018 07:01  -  11 Feb 2018 07:00  --------------------------------------------------------  IN: 1440 mL / OUT: 0 mL / NET: 1440 mL        PHYSICAL EXAM:    Constitutional: NAD, awake and alert, well-developed  Eyes:  EOMI,  Pupils round, no lesions  ENMT: no exudate or erythema  Pulmonary: Non-labored, breath sounds are clear bilaterally, No wheezing, rales or rhonchi  Cardiovascular: PMI not palpable RRR normal S1 and S2, no murmurs, rubs, gallops or clicks  Gastrointestinal: Bowel Sounds present, soft, nontender.   Lymph: No cervical lymphadenopathy.  Neurological: Alert, no focal deficits  Skin: No rashes.  No cyanosis.  Psych:  Mood & affect appropriate   Ext: No lower ext edema                                7.7    4.9   )-----------( 79       ( 11 Feb 2018 06:45 )             21.2     02-11    145  |  111<H>  |  97<H>  ----------------------------<  93  4.8   |  15<L>  |  7.99<H>    Ca    8.6      11 Feb 2018 08:39  Mg     2.0     02-10    TPro  7.1  /  Alb  3.3  /  TBili  0.4  /  DBili  x   /  AST  26  /  ALT  46<H>  /  AlkPhos  83  02-10    < from: 12 Lead ECG (02.09.18 @ 14:57) >    Ventricular Rate 76 BPM    Atrial Rate 76 BPM    P-R Interval 176 ms    QRS Duration 152 ms     ms    QTc 506 ms    P Axis 58 degrees    R Axis -6 degrees    T Axis 22 degrees    Diagnosis Line NORMAL SINUS RHYTHM  RIGHT BUNDLE BRANCH BLOCK  MODERATE VOLTAGE CRITERIA FOR LVH, MAY BE NORMAL VARIANT  ABNORMAL ECG    < end of copied text >  < from: Xray Chest 1 View AP/PA (02.09.18 @ 12:59) >    EXAM:  XR CHEST AP OR PA 1V                            PROCEDURE DATE:  02/09/2018            INTERPRETATION:  A single chest x-ray was obtained February 9, 2018.    Indication: Renal failure. Loss of breath.    Impression:    The heart is normal in size. The lungs are clear.                    ANGELO PATINO M.D., ATTENDING RADIOLOGIST  This document has been electronically signed. Feb 9 2018  1:22PM                < end of copied text >  < from: Transthoracic Echocardiogram (05.09.17 @ 10:56) >    Patient name: SARAH HENRY  YOB: 1960   Age: 56 (M)   MR#: [id number]  Study Date: 5/9/2017  Location: O/PSonographer: Lexii Muñoz JACKIE  Study quality: Technically Fair  Referring Physician: Nikolay Cohn MD  Blood Pressure: 146/80 mmHg  Height: 170 cm  Weight: 82 kg  BSA: 1.9 m2  ------------------------------------------------------------------------  PROCEDURE: Transthoracic echocardiogram with 2-D, M-Mode  and complete spectral and color flow Doppler.  INDICATION: Essential (primary) hypertension (I10)  ------------------------------------------------------------------------  DIMENSIONS:  Dimensions:     Normal Values:  LA:     3.2 cm    2.0 - 4.0 cm  Ao:     3.4 cm    2.0 - 3.8 cm  SEPTUM: 0.8 cm  0.6 - 1.2 cm  PWT:    0.8 cm    0.6 - 1.1 cm  LVIDd:  5.5 cm    3.0 - 5.6 cm  LVIDs:  3.7 cm    1.8 - 4.0 cm  Derived Variables:  LVMI: 82 g/m2  RWT: 0.29  Fractional short: 33 %  Ejection Fraction (Teicholtz): 61 %  ------------------------------------------------------------------------  OBSERVATIONS:  Mitral Valve: Normal mitral valve. Minimal mitral  regurgitation.  Aortic Root: Normal aortic root.  Aortic Valve: Aortic valve leaflet morphology not well  visualized. Mild aortic regurgitation.  Left Atrium: Normal left atrium.  Left Ventricle: Normal left ventricular systolic function.  No segmental wall motion abnormalities. Normal left  ventricular internal dimensions and wall thicknesses.  Right Heart: Normal right atrium. Normal right ventricular  size and function. Normal tricuspid valve.  Minimal  tricuspid regurgitation. Normal pulmonic valve.  Minimal  pulmonic regurgitation.  Pericardium/PleuraNormal pericardium with no pericardial  effusion.  ------------------------------------------------------------------------  CONCLUSIONS:  1. Normal mitral valve. Minimal mitral regurgitation.  2. Aortic valve leaflet morphology not well visualized.  Mild aortic regurgitation.  3. Normal left ventricular internal dimensions and wall  thicknesses.  4. Normal left ventricular systolic function. No segmental  wall motion abnormalities.  5. Normal right ventricular size and function.  ------------------------------------------------------------------------  Confirmed on  5/9/2017 -12:18:54 by Tobias Ríos M.D.  ------------------------------------------------------------------------    < end of copied text >

## 2018-02-11 NOTE — PROGRESS NOTE ADULT - ATTENDING COMMENTS
CKD5: no acute indication for dialysis  Anemia: transfusion as needed  Will hold off any EMILY until BP stable  Epistaxis: ENT followup;  will order DDAVP for platelet dysfunction in advanced CKD  HTN: Still suboptimal. Will add lasix 40 daily and increase hydralazine 25 TID. Increase amlodipine 5bid outpt dose

## 2018-02-11 NOTE — CHART NOTE - NSCHARTNOTEFT_GEN_A_CORE
Labetalol discontinued and hydralazine ordered for Qtc > 480 this evening as requested by Dr. Kennedy.     Joel Haney PA-C  Department of Medicine  [id number]

## 2018-02-12 ENCOUNTER — TRANSCRIPTION ENCOUNTER (OUTPATIENT)
Age: 58
End: 2018-02-12

## 2018-02-12 LAB
ANION GAP SERPL CALC-SCNC: 19 MMOL/L — HIGH (ref 5–17)
BUN SERPL-MCNC: 98 MG/DL — HIGH (ref 7–23)
CALCIUM SERPL-MCNC: 8.4 MG/DL — SIGNIFICANT CHANGE UP (ref 8.4–10.5)
CHLORIDE SERPL-SCNC: 106 MMOL/L — SIGNIFICANT CHANGE UP (ref 96–108)
CO2 SERPL-SCNC: 14 MMOL/L — LOW (ref 22–31)
CREAT SERPL-MCNC: 7.97 MG/DL — HIGH (ref 0.5–1.3)
GLUCOSE SERPL-MCNC: 87 MG/DL — SIGNIFICANT CHANGE UP (ref 70–99)
HCT VFR BLD CALC: 21.1 % — LOW (ref 39–50)
HGB BLD-MCNC: 7.2 G/DL — LOW (ref 13–17)
MCHC RBC-ENTMCNC: 29.9 PG — SIGNIFICANT CHANGE UP (ref 27–34)
MCHC RBC-ENTMCNC: 34.1 GM/DL — SIGNIFICANT CHANGE UP (ref 32–36)
MCV RBC AUTO: 87.6 FL — SIGNIFICANT CHANGE UP (ref 80–100)
PLATELET # BLD AUTO: 77 K/UL — LOW (ref 150–400)
POTASSIUM SERPL-MCNC: 4.3 MMOL/L — SIGNIFICANT CHANGE UP (ref 3.5–5.3)
POTASSIUM SERPL-SCNC: 4.3 MMOL/L — SIGNIFICANT CHANGE UP (ref 3.5–5.3)
RBC # BLD: 2.41 M/UL — LOW (ref 4.2–5.8)
RBC # FLD: 16.2 % — HIGH (ref 10.3–14.5)
SODIUM SERPL-SCNC: 139 MMOL/L — SIGNIFICANT CHANGE UP (ref 135–145)
WBC # BLD: 4.72 K/UL — SIGNIFICANT CHANGE UP (ref 3.8–10.5)
WBC # FLD AUTO: 4.72 K/UL — SIGNIFICANT CHANGE UP (ref 3.8–10.5)

## 2018-02-12 PROCEDURE — 99232 SBSQ HOSP IP/OBS MODERATE 35: CPT

## 2018-02-12 PROCEDURE — 99233 SBSQ HOSP IP/OBS HIGH 50: CPT | Mod: GC

## 2018-02-12 PROCEDURE — 77074 RADEX OSSEOUS SURVEY LMTD: CPT | Mod: 26

## 2018-02-12 RX ORDER — AMLODIPINE BESYLATE 2.5 MG/1
5 TABLET ORAL
Qty: 0 | Refills: 0 | Status: DISCONTINUED | OUTPATIENT
Start: 2018-02-12 | End: 2018-02-15

## 2018-02-12 RX ADMIN — Medication 1300 MILLIGRAM(S): at 18:18

## 2018-02-12 RX ADMIN — Medication 25 MILLIGRAM(S): at 22:34

## 2018-02-12 RX ADMIN — Medication 25 MILLIGRAM(S): at 06:47

## 2018-02-12 RX ADMIN — ARIPIPRAZOLE 20 MILLIGRAM(S): 15 TABLET ORAL at 11:08

## 2018-02-12 RX ADMIN — Medication 1300 MILLIGRAM(S): at 06:47

## 2018-02-12 RX ADMIN — Medication 75 MICROGRAM(S): at 06:47

## 2018-02-12 RX ADMIN — Medication 40 MILLIGRAM(S): at 06:47

## 2018-02-12 RX ADMIN — CINACALCET 90 MILLIGRAM(S): 30 TABLET, FILM COATED ORAL at 11:09

## 2018-02-12 RX ADMIN — OXYMETAZOLINE HYDROCHLORIDE 1 SPRAY(S): 0.5 SPRAY NASAL at 06:47

## 2018-02-12 RX ADMIN — Medication 100 MILLIGRAM(S): at 08:46

## 2018-02-12 RX ADMIN — OXYMETAZOLINE HYDROCHLORIDE 1 SPRAY(S): 0.5 SPRAY NASAL at 18:18

## 2018-02-12 RX ADMIN — Medication 100 MILLIGRAM(S): at 14:24

## 2018-02-12 RX ADMIN — Medication 150 MILLIGRAM(S): at 22:34

## 2018-02-12 RX ADMIN — Medication 200 MICROGRAM(S): at 06:47

## 2018-02-12 RX ADMIN — AMLODIPINE BESYLATE 5 MILLIGRAM(S): 2.5 TABLET ORAL at 06:47

## 2018-02-12 RX ADMIN — SODIUM CHLORIDE 3 MILLILITER(S): 9 INJECTION INTRAMUSCULAR; INTRAVENOUS; SUBCUTANEOUS at 06:51

## 2018-02-12 RX ADMIN — Medication 1 APPLICATION(S): at 06:47

## 2018-02-12 RX ADMIN — CALCITRIOL 0.25 MICROGRAM(S): 0.5 CAPSULE ORAL at 11:09

## 2018-02-12 RX ADMIN — AMLODIPINE BESYLATE 5 MILLIGRAM(S): 2.5 TABLET ORAL at 18:18

## 2018-02-12 RX ADMIN — Medication 1 APPLICATION(S): at 18:18

## 2018-02-12 RX ADMIN — SODIUM CHLORIDE 3 MILLILITER(S): 9 INJECTION INTRAMUSCULAR; INTRAVENOUS; SUBCUTANEOUS at 15:48

## 2018-02-12 RX ADMIN — SODIUM CHLORIDE 3 MILLILITER(S): 9 INJECTION INTRAMUSCULAR; INTRAVENOUS; SUBCUTANEOUS at 22:36

## 2018-02-12 RX ADMIN — Medication 25 MILLIGRAM(S): at 14:24

## 2018-02-12 RX ADMIN — Medication 8 MILLIGRAM(S): at 22:34

## 2018-02-12 NOTE — CONSULT NOTE ADULT - SUBJECTIVE AND OBJECTIVE BOX
This is a 56-year-old man with a past medical history of schizophrenia for which he has been treated in the past with lithium.  The patient suffers from chronic renal insufficiency.  He is followed by nephrology for this abnormality.  A workup revealed the presence of a monoclonal paraprotein for which the patient was referred to my office .  Patient reports that back in December , 2014 he had difficulty with chest discomfort and was found to have rib fractures.  The etiology of these rib fractures was unclear. Patient had been found on blood tests to have a monoclonal paraprotein.  He had a negative skeletal survey and negative 24-hour urine for Bence Nguyễn protein.  A bone marrow biopsy had previously been suggested but cirilo declined it and was then lost to follow up.  Patient now returns to my office for further followup.  He reports occasional difficulty with headaches.  He denies any fevers, chills or sweats.  No difficulty with frequent infections.  He does report fatigue. An AVF was in the process of being scheduled when he was found to have a prolonged ptt and was sent back to my office. Patient is experiencing fatigue. He also reports right shoulder pain. He is otherwise without complaints. SARAH HENRY  [id number]    Patient is a 57y old  Male who presents with a chief complaint of nose bleeds (12 Feb 2018 10:54)    HPI  56 yo M with PMH of bipolar 1, CKD (baseline Cr ~6 ),  anemia, HTN, hypothyroidism presented with epistaxis. Pt reported for the past two months he has been having nightly nose bleeds that will last 30 mins to several hours.  Pt able to get them to stop using cotton swabs and applying pressure, however he notes that it can sometimes take hours. Nose bleeds are worse with coughing. Pt reports he saw an ENT regarding this issue recently and has the bleeding cauterized, but it did not help. Additionally, pt has been experiencing worsening fatigue/generalized weakness x 2-3 months as well. Of note, pt follows closely with nephrology for anticipation of hemodialysis-- L. wrist AV fistula created 10/2017.   Pt saw his PMD recently and was sent into ED for low H/H from baseline on lab work.  Pt denies fever/chills, abdominal pain, sob, dizziness/lightheadedness, chest pain, palpitations, LE swelling, nausea/vomiting, diarrhea, hematuria, hematochezia, melena, other forms of blood loss.  Patient was previously seen in my office for MGUS. BMBX previously suggested. Prior negative skeletal survey and 24 hour urine for bence perkins.      Past Medical History  Hypothyroidism  Kidney failure  Renal insufficiency  HTN (hypertension)  Bipolar 1 disorder  Anemia  No significant past surgical history      Current Meds  MEDICATIONS  (STANDING):  amLODIPine   Tablet 5 milliGRAM(s) Oral two times a day  ARIPiprazole 20 milliGRAM(s) Oral daily  BACItracin   Ointment 1 Application(s) Topical two times a day  calcitriol   Capsule 0.25 MICROGram(s) Oral daily  chlorproMAZINE    Tablet 100 milliGRAM(s) Oral <User Schedule>  chlorproMAZINE    Tablet 150 milliGRAM(s) Oral at bedtime  cinacalcet 90 milliGRAM(s) Oral daily  doxazosin 8 milliGRAM(s) Oral at bedtime  furosemide    Tablet 40 milliGRAM(s) Oral daily  hydrALAZINE 25 milliGRAM(s) Oral three times a day  HYDROmorphone  Injectable 1 milliGRAM(s) IV Push once  levothyroxine 200 MICROGram(s) Oral daily  levothyroxine 75 MICROGram(s) Oral daily  oxymetazoline 0.05% Nasal Spray 1 Spray(s) Both Nostrils two times a day  sodium bicarbonate 1300 milliGRAM(s) Oral two times a day  sodium chloride 0.9% lock flush 3 milliLiter(s) IV Push every 8 hours    MEDICATIONS  (PRN):  acetaminophen   Tablet. 650 milliGRAM(s) Oral every 6 hours PRN pain or fever  sodium chloride 0.65% Nasal 1 Spray(s) Both Nostrils three times a day PRN Nasal Congestion      Allergies    No Known Allergies    Social History  Single. No tob.  No etoh    Family History  No pertinent family history in first degree relatives      Review of System        General:	Denies fatigue, fevers, chills, sweats, decreased appetite.    Skin/Breast: denies pruritis, rash  	  Ophthalmologic: no change in vision or blurring  	  HEENT	Denies dry mouth, oral sores, dysphagia,  change in hearing.    Respiratory and Thorax:  cough, sob, wheeze, hemoptysis  	  Cardiovascular:	no cp , palp, orthopnea    Gastrointestinal:	no n/v/d constipation    Genitourinary:	no dysuria of frequency, no hematuria, no flank pain    Musculoskeletal:	no bone or joint pain. no muscle aches.     Neurological:	no change in sensory or motor function. no headache. no weakness.     Psychiatric:	no depression, no anxiety, insomnia.     Hematology/Lymphatics:	no bleeding or bruising        Vitals  Vital Signs Last 24 Hrs  T(C): 36.4 (12 Feb 2018 11:57), Max: 36.9 (12 Feb 2018 03:47)  T(F): 97.6 (12 Feb 2018 11:57), Max: 98.5 (12 Feb 2018 03:47)  HR: 75 (12 Feb 2018 14:00) (75 - 90)  BP: 151/83 (12 Feb 2018 14:00) (144/80 - 158/88)  BP(mean): --  RR: 18 (12 Feb 2018 11:57) (18 - 18)  SpO2: 99% (12 Feb 2018 11:57) (96% - 100%)    Physical Exam  PHYSICAL EXAM:      Constitutional: NAD    Eyes: PERRLA EOMI, anicteric sclera    Heent :No oral sores, no pharyngeal injection. moist mucosa.    Neck: supple, no jvd, no LAD    Respiratory: CTA b/l     Cardiovascular: s1s2, no m/g/r    Gastrointestinal: soft, nt, nd, + BS    Extremities: no c/c/e    Neurological:A&O x 3 moves all ext.    Skin: no rash on exposed skin    Lymph Nodes: no lymphadenopathy.              Lab  CBC Full  -  ( 12 Feb 2018 07:30 )  WBC Count : 4.72 K/uL  Hemoglobin : 7.2 g/dL  Hematocrit : 21.1 %  Platelet Count - Automated : 77 K/uL  Mean Cell Volume : 87.6 fl  Mean Cell Hemoglobin : 29.9 pg  Mean Cell Hemoglobin Concentration : 34.1 gm/dL  Auto Neutrophil # : x  Auto Lymphocyte # : x  Auto Monocyte # : x  Auto Eosinophil # : x  Auto Basophil # : x  Auto Neutrophil % : x  Auto Lymphocyte % : x  Auto Monocyte % : x  Auto Eosinophil % : x  Auto Basophil % : x    02-12    139  |  106  |  98<H>  ----------------------------<  87  4.3   |  14<L>  |  7.97<H>    Ca    8.4      12 Feb 2018 07:41          Rad:    Assessment/Plan

## 2018-02-12 NOTE — DISCHARGE NOTE ADULT - CARE PLAN
Principal Discharge DX:	Epistaxis  Goal:	Resolved  Assessment and plan of treatment:	Continue with  nasal saline spray  as ordered.  Avoid bending forward , heavy lifting, trauma to nose, and blowing your nose.  Follow-up with your primary care physician within 1 week. Call for appointment.  Secondary Diagnosis:	CKD (chronic kidney disease), stage V  Assessment and plan of treatment:	Avoid taking (NSAIDs) - (ex: Ibuprofen, Advil, Celebrex, Naprosyn)  Avoid taking any nephrotoxic agents (can harm kidneys) - Intravenous contrast for diagnostic testing, combination cold medications.  Have all medications adjusted for your renal function by your Health Care Provider.  Blood pressure control is important.  Take all medication as prescribed.  Secondary Diagnosis:	Anemia  Assessment and plan of treatment:	Symptoms to report, bleeding, palpitations, fatigue, pale skin, cold skin, dizziness. Take medications as ordered by PCP  Secondary Diagnosis:	Bipolar 1 disorder  Assessment and plan of treatment:	Continue with your medications as prescribed by your doctor.  Follow-up with your primary care physician and Hematologist/Oncologist within 1 week. Call for appointment.  Secondary Diagnosis:	HTN (hypertension)  Assessment and plan of treatment:	Low salt diet  Activity as tolerated.  Take all medication as prescribed.  Follow up with your medical doctor for routine blood pressure monitoring at your next visit.  Notify your doctor if you have any of the following symptoms:   Dizziness, Lightheadedness, Blurry vision, Headache, Chest pain, Shortness of breath  Secondary Diagnosis:	Hypothyroidism  Assessment and plan of treatment:	you do not make enough thyroid hormone  signs & symptoms of low levels of thyroid hormone - tired, getting cold easily, coarse or thin hair, constipation, shortness of breath, swelling, irregular periods  your doctor will do thyroid hormone blood tests at least once a year to monitor if medication dose is adequate  take your thyroid medicine as directed by your doctor & on empty stomach  Secondary Diagnosis:	Thrombocytopenia  Assessment and plan of treatment:	Avoid injury.  Follow-up with your primary Hematologist/Oncologist within 1 week. Call for appointment.

## 2018-02-12 NOTE — PROGRESS NOTE ADULT - PROBLEM SELECTOR PLAN 1
CKD (chronic kidney disease), stage V. Recommendation: Patient with history of CKD due to chronic lithium. On review of previous labs in [employer], last Scr. is 5.59 on 12/11/18. Latest Scr. is 7.97. Patient is currently not uremic or in fluid overload. No urgent indication for HD today. Continue to monitor Scr. and electrolytes. Monitor BMP daily. 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 On review of previous labs in [employer], last Scr. is 5.59 on 12/11/18. Latest Scr. is 7.97. Patient is currently not uremic or in fluid overload. No urgent indication for HD today. Continue to monitor Scr. and electrolytes. Monitor BMP daily.

## 2018-02-12 NOTE — PROGRESS NOTE ADULT - SUBJECTIVE AND OBJECTIVE BOX
[facility] Division of Kidney Diseases & Hypertension  FOLLOW UP NOTE  [phone]--------------------------------------------------------------------------------    6 yo M with PMH of bipolar 1, CKD stage V,  anemia, HTN, hypothyroidism admitted for epistaxis and symptomatic. Patient had multiple blood transfusions during hospitalization. Currently patient denies further episodes of epistaxis. He denies complains of SOB, CP, abdominal pain, nausea, vomiting.     PAST HISTORY  --------------------------------------------------------------------------------  No significant changes to PMH, PSH, FHx, SHx, unless otherwise noted    ALLERGIES & MEDICATIONS  --------------------------------------------------------------------------------  Allergies    No Known Allergies    Intolerances      Standing Inpatient Medications  amLODIPine   Tablet 5 milliGRAM(s) Oral two times a day  ARIPiprazole 20 milliGRAM(s) Oral daily  BACItracin   Ointment 1 Application(s) Topical two times a day  calcitriol   Capsule 0.25 MICROGram(s) Oral daily  chlorproMAZINE    Tablet 100 milliGRAM(s) Oral <User Schedule>  chlorproMAZINE    Tablet 150 milliGRAM(s) Oral at bedtime  cinacalcet 90 milliGRAM(s) Oral daily  doxazosin 8 milliGRAM(s) Oral at bedtime  furosemide    Tablet 40 milliGRAM(s) Oral daily  hydrALAZINE 25 milliGRAM(s) Oral three times a day  HYDROmorphone  Injectable 1 milliGRAM(s) IV Push once  levothyroxine 200 MICROGram(s) Oral daily  levothyroxine 75 MICROGram(s) Oral daily  oxymetazoline 0.05% Nasal Spray 1 Spray(s) Both Nostrils two times a day  sodium bicarbonate 1300 milliGRAM(s) Oral two times a day  sodium chloride 0.9% lock flush 3 milliLiter(s) IV Push every 8 hours    PRN Inpatient Medications  acetaminophen   Tablet. 650 milliGRAM(s) Oral every 6 hours PRN  sodium chloride 0.65% Nasal 1 Spray(s) Both Nostrils three times a day PRN      REVIEW OF SYSTEMS  --------------------------------------------------------------------------------  Gen: No  fevers/chills  Head/Eyes/Ears/Mouth: No headache  Respiratory: No dyspnea  CV: No chest pain  GI: No abdominal pain,nausea, vomiting  MSK:  no edema      All other systems were reviewed and are negative, except as noted.    VITALS/PHYSICAL EXAM  --------------------------------------------------------------------------------  T(C): 36.4 (02-12-18 @ 11:57), Max: 36.9 (02-12-18 @ 03:47)  HR: 75 (02-12-18 @ 14:00) (75 - 90)  BP: 151/83 (02-12-18 @ 14:00) (144/80 - 158/88)  RR: 18 (02-12-18 @ 11:57) (18 - 18)  SpO2: 99% (02-12-18 @ 11:57) (96% - 100%)  Wt(kg): --        02-11-18 @ 07:01  -  02-12-18 @ 07:00  --------------------------------------------------------  IN: 1440 mL / OUT: 0 mL / NET: 1440 mL    02-12-18 @ 07:01  -  02-12-18 @ 15:38  --------------------------------------------------------  IN: 440 mL / OUT: 0 mL / NET: 440 mL      Physical Exam:  	             Gen: NAD  	HEENT: sclera anicteric, no JVD  	Pulm: CTA B/L  	CV:  S1S2 no JVD  	Abd: +BS, soft   	Ext: No B/L Lower ext edema  	Neuro: No focal deficits               Skin: Warm and dry     LABS/STUDIES  --------------------------------------------------------------------------------              7.2    4.72  >-----------<  77       [02-12-18 @ 07:30]              21.1     139  |  106  |  98  ----------------------------<  87      [02-12-18 @ 07:41]  4.3   |  14  |  7.97        Ca     8.4     [02-12-18 @ 07:41]            Creatinine Trend:  SCr 7.97 [02-12 @ 07:41]  SCr 7.99 [02-11 @ 08:39]  SCr 7.56 [02-10 @ 09:30]  SCr 7.35 [02-09 @ 13:12]        TSH 6.91      [02-10-18 @ 09:30] [facility] Division of Kidney Diseases & Hypertension  FOLLOW UP NOTE  [phone]--------------------------------------------------------------------------------    56 yo M with PMH of bipolar 1, CKD stage V,  anemia, HTN, hypothyroidism admitted for epistaxis and symptomatic anemia. Patient had multiple blood transfusions during hospitalization. Currently patient denies further episodes of epistaxis. He denies complains of SOB, CP, abdominal pain, nausea, vomiting.     PAST HISTORY  --------------------------------------------------------------------------------  No significant changes to PMH, PSH, FHx, SHx, unless otherwise noted    ALLERGIES & MEDICATIONS  --------------------------------------------------------------------------------  Allergies    No Known Allergies    Intolerances      Standing Inpatient Medications  amLODIPine   Tablet 5 milliGRAM(s) Oral two times a day  ARIPiprazole 20 milliGRAM(s) Oral daily  BACItracin   Ointment 1 Application(s) Topical two times a day  calcitriol   Capsule 0.25 MICROGram(s) Oral daily  chlorproMAZINE    Tablet 100 milliGRAM(s) Oral <User Schedule>  chlorproMAZINE    Tablet 150 milliGRAM(s) Oral at bedtime  cinacalcet 90 milliGRAM(s) Oral daily  doxazosin 8 milliGRAM(s) Oral at bedtime  furosemide    Tablet 40 milliGRAM(s) Oral daily  hydrALAZINE 25 milliGRAM(s) Oral three times a day  HYDROmorphone  Injectable 1 milliGRAM(s) IV Push once  levothyroxine 200 MICROGram(s) Oral daily  levothyroxine 75 MICROGram(s) Oral daily  oxymetazoline 0.05% Nasal Spray 1 Spray(s) Both Nostrils two times a day  sodium bicarbonate 1300 milliGRAM(s) Oral two times a day  sodium chloride 0.9% lock flush 3 milliLiter(s) IV Push every 8 hours    PRN Inpatient Medications  acetaminophen   Tablet. 650 milliGRAM(s) Oral every 6 hours PRN  sodium chloride 0.65% Nasal 1 Spray(s) Both Nostrils three times a day PRN      REVIEW OF SYSTEMS  --------------------------------------------------------------------------------  Gen: No  fevers/chills  Head/Eyes/Ears/Mouth: No headache  Respiratory: No dyspnea  CV: No chest pain  GI: No abdominal pain,nausea, vomiting  MSK:  no edema      All other systems were reviewed and are negative, except as noted.    VITALS/PHYSICAL EXAM  --------------------------------------------------------------------------------  T(C): 36.4 (02-12-18 @ 11:57), Max: 36.9 (02-12-18 @ 03:47)  HR: 75 (02-12-18 @ 14:00) (75 - 90)  BP: 151/83 (02-12-18 @ 14:00) (144/80 - 158/88)  RR: 18 (02-12-18 @ 11:57) (18 - 18)  SpO2: 99% (02-12-18 @ 11:57) (96% - 100%)  Wt(kg): --        02-11-18 @ 07:01  -  02-12-18 @ 07:00  --------------------------------------------------------  IN: 1440 mL / OUT: 0 mL / NET: 1440 mL    02-12-18 @ 07:01  -  02-12-18 @ 15:38  --------------------------------------------------------  IN: 440 mL / OUT: 0 mL / NET: 440 mL      Physical Exam:  	             Gen: NAD  	HEENT: sclera anicteric, no JVD  	Pulm: CTA B/L  	CV:  S1S2 no JVD  	Abd: +BS, soft   	Ext: No B/L Lower ext edema  	Neuro: No focal deficits               Skin: Warm and dry                 Vascular: lou ROBERTSON    LABS/STUDIES  --------------------------------------------------------------------------------              7.2    4.72  >-----------<  77       [02-12-18 @ 07:30]              21.1     139  |  106  |  98  ----------------------------<  87      [02-12-18 @ 07:41]  4.3   |  14  |  7.97        Ca     8.4     [02-12-18 @ 07:41]            Creatinine Trend:  SCr 7.97 [02-12 @ 07:41]  SCr 7.99 [02-11 @ 08:39]  SCr 7.56 [02-10 @ 09:30]  SCr 7.35 [02-09 @ 13:12]        TSH 6.91      [02-10-18 @ 09:30]

## 2018-02-12 NOTE — PROGRESS NOTE ADULT - PROBLEM SELECTOR PLAN 2
In setting of epistaxis and renal failure: Hb is 7.2 today. Patient will receive 1 unit of PRBC today. Received 1 dose of DDAVP yesterday. Continue to monitor Hb. In setting of epistaxis and renal failure: Hb is 7.2 today. Patient will receive 1 unit of PRBC today. Received 1 dose of DDAVP yesterday. Continue to monitor Hb.  Hgb does not seem to be responding to PRBCs.

## 2018-02-12 NOTE — CONSULT NOTE ADULT - ASSESSMENT
anemia. Thrombocytopenia. H/o CKD, lithium toxicity. Patient also with h/o MGUS, negative skeletal survey and 24 hour urine for bence perkins. anemia. Thrombocytopenia. H/o CKD, lithium toxicity. Renal failure. Renal following. Patient also with h/o MGUS, negative skeletal survey and 24 hour urine for bence perkins.  - needs bmbx to r/o plasma cell dyscrasia playing role in current hematologic findings. Patietn has avoided this test in the past , but now agrees to proceed. Will arrange  - will check paraprotein levels.   - will check anemia w/u  - monitor cbc and provide transfusional support prn.  - suggest skeletal survey and 24 hour urine for bence perkins. Will order.

## 2018-02-12 NOTE — PROGRESS NOTE ADULT - SUBJECTIVE AND OBJECTIVE BOX
chief complaint : nose bleed        SUBJECTIVE / OVERNIGHT EVENTS: pt denies chest pain, shortness of breath, nausea, vomiting , nose bleed+    MEDICATIONS  (STANDING):  amLODIPine   Tablet 5 milliGRAM(s) Oral two times a day  ARIPiprazole 20 milliGRAM(s) Oral daily  BACItracin   Ointment 1 Application(s) Topical two times a day  calcitriol   Capsule 0.25 MICROGram(s) Oral daily  chlorproMAZINE    Tablet 100 milliGRAM(s) Oral <User Schedule>  chlorproMAZINE    Tablet 150 milliGRAM(s) Oral at bedtime  cinacalcet 90 milliGRAM(s) Oral daily  doxazosin 8 milliGRAM(s) Oral at bedtime  furosemide    Tablet 40 milliGRAM(s) Oral daily  hydrALAZINE 25 milliGRAM(s) Oral three times a day  HYDROmorphone  Injectable 1 milliGRAM(s) IV Push once  levothyroxine 200 MICROGram(s) Oral daily  levothyroxine 75 MICROGram(s) Oral daily  oxymetazoline 0.05% Nasal Spray 1 Spray(s) Both Nostrils two times a day  sodium bicarbonate 1300 milliGRAM(s) Oral two times a day  sodium chloride 0.9% lock flush 3 milliLiter(s) IV Push every 8 hours    MEDICATIONS  (PRN):  acetaminophen   Tablet. 650 milliGRAM(s) Oral every 6 hours PRN pain or fever  sodium chloride 0.65% Nasal 1 Spray(s) Both Nostrils three times a day PRN Nasal Congestion      Vital Signs Last 24 Hrs  T(C): 36.7 (12 Feb 2018 22:32), Max: 36.9 (12 Feb 2018 03:47)  T(F): 98 (12 Feb 2018 22:32), Max: 98.5 (12 Feb 2018 03:47)  HR: 83 (12 Feb 2018 22:32) (75 - 90)  BP: 169/92 (12 Feb 2018 22:32) (144/80 - 169/92)  BP(mean): --  RR: 18 (12 Feb 2018 22:32) (18 - 18)  SpO2: 100% (12 Feb 2018 22:32) (96% - 100%)    Constitutional: No fever, fatigue  Skin: No rash.  Eyes: No recent vision problems or eye pain.  ENT: No congestion, ear pain, or sore throat.  Cardiovascular: No chest pain or palpation.  Respiratory: No cough, shortness of breath, congestion, or wheezing.  Gastrointestinal: No abdominal pain, nausea, vomiting, or diarrhea.  Genitourinary: No dysuria.  Musculoskeletal: No joint swelling.  Neurologic: No headache.    PHYSICAL EXAM:  GENERAL: NAD  EYES: EOMI, PERRLA  karthik nostril bleeding +  NECK: Supple, No JVD  CHEST/LUNG: dec breath sounds at abses   HEART:  S1 , S2 +  ABDOMEN: soft, bs+  EXTREMITIES:  trace edema  NEUROLOGY: alert awake oriented     LABS:  02-12    139  |  106  |  98<H>  ----------------------------<  87  4.3   |  14<L>  |  7.97<H>    Ca    8.4      12 Feb 2018 07:41      Creatinine Trend: 7.97 <--, 7.99 <--, 7.56 <--, 7.35 <--                        7.2    4.72  )-----------( 77       ( 12 Feb 2018 07:30 )             21.1     Urine Studies:

## 2018-02-12 NOTE — DISCHARGE NOTE ADULT - MEDICATION SUMMARY - MEDICATIONS TO TAKE
I will START or STAY ON the medications listed below when I get home from the hospital:    acetaminophen 325 mg oral tablet  -- 2 tab(s) by mouth every 6 hours, As needed, pain or fever  -- Indication: For Pain or fever    sodium bicarbonate 650 mg oral tablet  -- 2 tab(s) by mouth 2 times a day  -- Indication: For Chronic kidney disease    doxazosin 8 mg oral tablet  -- 1 tab(s) by mouth once a day (at bedtime)  -- Indication: For High blood pressure    chlorproMAZINE 100 mg oral tablet  -- 1 tab(s) by mouth 2 times a day  -- Indication: For Bipolar 1 disorder    chlorproMAZINE 100 mg oral tablet  -- 1.5 tab(s) by mouth once a day (at bedtime)  -- Indication: For Bipolar 1 disorder    Abilify 20 mg oral tablet  -- 1 tab(s) by mouth once a day  -- Indication: For Bipolar 1 disorder    metoprolol succinate 100 mg oral tablet, extended release  -- 1 tab(s) by mouth once a day. Do not take if systolic BP less than 110, or heart rate < 60. Follow-up with your doctor.  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    amLODIPine 5 mg oral tablet  -- 1 tab(s) by mouth once a day  -- Indication: For High blood pressure    Kionex 15 g/60 mL oral and rectal suspension  -- 60 milliliter(s) by mouth and rectally 3 times a week  -- Indication: For High potassium levels    bacitracin 500 units/g topical ointment  -- 1 application on skin 2 times a day  -- Indication: For skin care    furosemide 40 mg oral tablet  -- 1 tab(s) by mouth once a day  -- Indication: For fluid overload    Nephron-FA oral tablet  -- 2 tab(s) by mouth once a day  -- Indication: For supplement    sodium chloride 0.65% nasal spray  -- 2 spray(s) into nose 4 times a day  -- Indication: For Epistaxis    levothyroxine 200 mcg (0.2 mg) oral tablet  -- 1 tab(s) by mouth once a day  -- Indication: For Hypothyroidism    Synthroid 75 mcg (0.075 mg) oral tablet  -- 1 tab(s) by mouth once a day  -- Indication: For Hypothyroidism    calcitriol 0.25 mcg oral capsule  -- 1 cap(s) by mouth once a day  -- Indication: For supplement I will START or STAY ON the medications listed below when I get home from the hospital:    acetaminophen 325 mg oral tablet  -- 2 tab(s) by mouth every 6 hours, As needed, pain or fever  -- Indication: For Pain or fever    sodium bicarbonate 650 mg oral tablet  -- 2 tab(s) by mouth 2 times a day  -- Indication: For Chronic kidney disease    doxazosin 8 mg oral tablet  -- 1 tab(s) by mouth once a day (at bedtime)  -- Indication: For High blood pressure    chlorproMAZINE 100 mg oral tablet  -- 1 tab(s) by mouth 2 times a day  -- Indication: For Bipolar 1 disorder    chlorproMAZINE 100 mg oral tablet  -- 1.5 tab(s) by mouth once a day (at bedtime)  -- Indication: For Bipolar 1 disorder    Abilify 20 mg oral tablet  -- 1 tab(s) by mouth once a day  -- Indication: For Bipolar 1 disorder    metoprolol succinate 100 mg oral tablet, extended release  -- 1 tab(s) by mouth once a day. Do not take if systolic BP less than 110, or heart rate < 60. Follow-up with your doctor.  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    amLODIPine 5 mg oral tablet  -- 1 tab(s) by mouth 2 times a day  -- Indication: For High blood pressure    Kionex 15 g/60 mL oral and rectal suspension  -- 60 milliliter(s) by mouth and rectally 3 times a week  -- Indication: For High potassium levels    bacitracin 500 units/g topical ointment  -- 1 application on skin 2 times a day  -- Indication: For skin care    furosemide 40 mg oral tablet  -- 1 tab(s) by mouth once a day  -- Indication: For fluid overload    Nephron-FA oral tablet  -- 2 tab(s) by mouth once a day  -- Indication: For supplement    sodium chloride 0.65% nasal spray  -- 2 spray(s) into nose 4 times a day  -- Indication: For Epistaxis    levothyroxine 200 mcg (0.2 mg) oral tablet  -- 1 tab(s) by mouth once a day  -- Indication: For Hypothyroidism    Synthroid 75 mcg (0.075 mg) oral tablet  -- 1 tab(s) by mouth once a day  -- Indication: For Hypothyroidism    calcitriol 0.25 mcg oral capsule  -- 1 cap(s) by mouth once a day  -- Indication: For supplement

## 2018-02-12 NOTE — DISCHARGE NOTE ADULT - PROVIDER TOKENS
TOKEN:'[id number]',TOKEN:'[id number]' TOKEN:'[id number]',TOKEN:'[id number]',FREE:[LAST:[Dr. Steven Wechsler],FIRST:[PMD],PHONE:[(   )    -],FAX:[(   )    -]]

## 2018-02-12 NOTE — DISCHARGE NOTE ADULT - CARE PROVIDER_API CALL
Michelle Bills), Internal Medicine; Nephrology  [address] [place] [address]  2nd [place] [address]  Phone: [phone]  Fax: [phone]    Michele Swift), Hematology; Medical Oncology  [address] [place] [address]  Phone: [phone]  Fax: [phone] Michelle Bills), Internal Medicine; Nephrology  [address] [place] [address]  2nd [place] [address]  Phone: [phone]  Fax: [phone]    Michele Swift), Hematology; Medical Oncology  [address] [place] [address]  Phone: [phone]  Fax: [phone]    Dr. Steven Wechsler, PMD  Phone: (   )    -  Fax: (   )    -

## 2018-02-12 NOTE — DISCHARGE NOTE ADULT - HOSPITAL COURSE
Anemia. Thrombocytopenia. H/o CKD, lithium toxicity. Renal failure. Renal following. Patient also with h/o MGUS, negative skeletal survey and 24 hour urine for bence perkins.  - S/p bmbx to r/o plasma cell dyscrasia as playing role in current hematologic findings.  - paraprotein levels remain relatively stable.   - monitor cbc and provide transfusional support prn.  -  skeletal survey negative    - no heme-onc objection to dc with outpatient f/u 57 year old male with h/o bipolar disorder, CKD stage V, HTN, hypothyroidism admitted for symptomatic anemia.    Problem/Plan - 1: <[id number] >  ·  Problem: CKD (chronic kidney disease), stage V.  Plan: Patient with history of CKD due to chronic lithium. On review of previous labs in [employer], last Scr. is 5.59 on 12/11/18. Per renal: No need for urgent HD at this time. Pt and family wants pt discharged. Pt is stable for discharge per renal. Pt will follow-up with Dr. Bills next week.       Problem/Plan - 2: <[id number] >  ·  Problem: Anemia.  Plan: In setting of epistaxis and renal failure: Hb is 7.9 today. Hem/Onc on board; Pt had BM biopsy 2/14/2018. Pt will follow-up with Heme.     Problem/Plan - 3: <[id number] >  ·  Problem: HTN (hypertension).  Plan: BP still elevated. . Goal BP is less than 140/90 mm Hg. Continue with Norvasc, doxazosin. Resume Metoprolol at reduced dose of 100mg ER once daily in setting of h/o prolonged QTC. Pt will follow-up with his PMD and renal.       Anemia. Thrombocytopenia. H/o CKD, lithium toxicity. Renal failure. Renal following. Patient also with h/o MGUS, negative skeletal survey and 24 hour urine for bence perkins.  - S/p bmbx to r/o plasma cell dyscrasia as playing role in current hematologic findings.  - paraprotein levels remain relatively stable.   - monitor cbc and provide transfusional support prn.  -  skeletal survey negative    Pt is stable for discharge.

## 2018-02-12 NOTE — DISCHARGE NOTE ADULT - ADDITIONAL INSTRUCTIONS
Follow-up with your primary care physician and Hematologist/Oncologist within 1 week. Call for appointment.  Follow-up with Nephrologist Dr. Bills within 1 week. Call for appointment.

## 2018-02-12 NOTE — DISCHARGE NOTE ADULT - CARE PROVIDERS DIRECT ADDRESSES
,[email or web address],DirectAddress_Unknown ,[email or web address],DirectAddress_Unknown,DirectAddress_Unknown

## 2018-02-12 NOTE — DISCHARGE NOTE ADULT - PATIENT PORTAL LINK FT
You can access the [facility], offered by [facility], by registering with the following website: [email or web address]

## 2018-02-12 NOTE — DISCHARGE NOTE ADULT - MEDICATION SUMMARY - MEDICATIONS TO CHANGE
I will SWITCH the dose or number of times a day I take the medications listed below when I get home from the hospital:    metoprolol succinate 200 mg oral tablet, extended release  -- 1 tab(s) by mouth once a day    amLODIPine 5 mg oral tablet  -- 1 tab(s) by mouth once a day

## 2018-02-12 NOTE — PROGRESS NOTE ADULT - SUBJECTIVE AND OBJECTIVE BOX
[facility] - Sada Trinidad, Anne, Dc, Liliya, Winsome Buitrago  Office Number:  [phone]    Patient resting comfortably in bed in NAD.  Laying flat with no respiratory distress.  No complaints of chest pain, dyspnea, palpitations, PND, or orthopnea.  Sitting comfortably in chair  EKG reviewed. Qtc <500    ROS: negative unless otherwise mentioned.    Telemetry:  Not on tele    MEDICATIONS  (STANDING):  amLODIPine   Tablet 5 milliGRAM(s) Oral two times a day  ARIPiprazole 20 milliGRAM(s) Oral daily  BACItracin   Ointment 1 Application(s) Topical two times a day  calcitriol   Capsule 0.25 MICROGram(s) Oral daily  chlorproMAZINE    Tablet 100 milliGRAM(s) Oral <User Schedule>  chlorproMAZINE    Tablet 150 milliGRAM(s) Oral at bedtime  cinacalcet 90 milliGRAM(s) Oral daily  doxazosin 8 milliGRAM(s) Oral at bedtime  furosemide    Tablet 40 milliGRAM(s) Oral daily  hydrALAZINE 25 milliGRAM(s) Oral three times a day  HYDROmorphone  Injectable 1 milliGRAM(s) IV Push once  levothyroxine 200 MICROGram(s) Oral daily  levothyroxine 75 MICROGram(s) Oral daily  oxymetazoline 0.05% Nasal Spray 1 Spray(s) Both Nostrils two times a day  sodium bicarbonate 1300 milliGRAM(s) Oral two times a day  sodium chloride 0.9% lock flush 3 milliLiter(s) IV Push every 8 hours    MEDICATIONS  (PRN):  acetaminophen   Tablet. 650 milliGRAM(s) Oral every 6 hours PRN pain or fever  sodium chloride 0.65% Nasal 1 Spray(s) Both Nostrils three times a day PRN Nasal Congestion      Allergies    No Known Allergies    Intolerances        Vital Signs Last 24 Hrs  T(C): 36.4 (12 Feb 2018 11:57), Max: 36.9 (12 Feb 2018 03:47)  T(F): 97.6 (12 Feb 2018 11:57), Max: 98.5 (12 Feb 2018 03:47)  HR: 90 (12 Feb 2018 11:57) (73 - 90)  BP: 158/88 (12 Feb 2018 11:57) (144/80 - 158/88)  BP(mean): --  RR: 18 (12 Feb 2018 11:57) (18 - 18)  SpO2: 99% (12 Feb 2018 11:57) (96% - 100%)    I&O's Summary    11 Feb 2018 07:01  -  12 Feb 2018 07:00  --------------------------------------------------------  IN: 1440 mL / OUT: 0 mL / NET: 1440 mL    12 Feb 2018 07:01  -  12 Feb 2018 12:10  --------------------------------------------------------  IN: 200 mL / OUT: 0 mL / NET: 200 mL        ON EXAM:    Constitutional: NAD, awake and alert, well-developed  Eyes:  EOMI,  Pupils round, no lesions  ENMT: no exudate or erythema  Pulmonary: Non-labored, breath sounds are clear bilaterally, No wheezing, rales or rhonchi  Cardiovascular: PMI not palpable RRR normal S1 and S2, no murmurs, rubs, gallops or clicks  Gastrointestinal: Bowel Sounds present, soft, nontender.   Lymph: No cervical lymphadenopathy.  Neurological: Alert, no focal deficits  Skin: No rashes.  No cyanosis.  Psych:  Mood & affect appropriate   Ext:+ edema  LABS: All Labs Reviewed:                        7.2    4.72  )-----------( 77       ( 12 Feb 2018 07:30 )             21.1                         7.2    5.02  )-----------( 80       ( 11 Feb 2018 21:35 )             21.4                         7.7    4.9   )-----------( 79       ( 11 Feb 2018 06:45 )             21.2     12 Feb 2018 07:41    139    |  106    |  98     ----------------------------<  87     4.3     |  14     |  7.97   11 Feb 2018 08:39    145    |  111    |  97     ----------------------------<  93     4.8     |  15     |  7.99   10 Feb 2018 09:30    140    |  105    |  92     ----------------------------<  82     4.5     |  14     |  7.56     Ca    8.4        12 Feb 2018 07:41  Ca    8.6        11 Feb 2018 08:39  Ca    8.5        10 Feb 2018 09:30  Mg     2.0       10 Feb 2018 09:30    TPro  7.1    /  Alb  3.3    /  TBili  0.4    /  DBili  x      /  AST  26     /  ALT  46     /  AlkPhos  83     10 Feb 2018 09:30  TPro  7.8    /  Alb  3.7    /  TBili  0.2    /  DBili  x      /  AST  51     /  ALT  55     /  AlkPhos  85     09 Feb 2018 13:12          Blood Culture:     02-10 @ 09:30  TSH: 6.91

## 2018-02-12 NOTE — PROGRESS NOTE ADULT - PROBLEM SELECTOR PLAN 3
BP still elevated. Can titrate up dose of hydralazine as needed. Goal BP is less than 140/90 mm Hg. Monitor BP.

## 2018-02-12 NOTE — DISCHARGE NOTE ADULT - PLAN OF CARE
you do not make enough thyroid hormone  signs & symptoms of low levels of thyroid hormone - tired, getting cold easily, coarse or thin hair, constipation, shortness of breath, swelling, irregular periods  your doctor will do thyroid hormone blood tests at least once a year to monitor if medication dose is adequate  take your thyroid medicine as directed by your doctor & on empty stomach Avoid injury.  Follow-up with your primary Hematologist/Oncologist within 1 week. Call for appointment. Resolved Continue with  nasal saline spray  as ordered.  Avoid bending forward , heavy lifting, trauma to nose, and blowing your nose.  Follow-up with your primary care physician within 1 week. Call for appointment. Avoid taking (NSAIDs) - (ex: Ibuprofen, Advil, Celebrex, Naprosyn)  Avoid taking any nephrotoxic agents (can harm kidneys) - Intravenous contrast for diagnostic testing, combination cold medications.  Have all medications adjusted for your renal function by your Health Care Provider.  Blood pressure control is important.  Take all medication as prescribed. Symptoms to report, bleeding, palpitations, fatigue, pale skin, cold skin, dizziness. Take medications as ordered by PCP Continue with your medications as prescribed by your doctor.  Follow-up with your primary care physician and Hematologist/Oncologist within 1 week. Call for appointment. Low salt diet  Activity as tolerated.  Take all medication as prescribed.  Follow up with your medical doctor for routine blood pressure monitoring at your next visit.  Notify your doctor if you have any of the following symptoms:   Dizziness, Lightheadedness, Blurry vision, Headache, Chest pain, Shortness of breath

## 2018-02-12 NOTE — DISCHARGE NOTE ADULT - SECONDARY DIAGNOSIS.
Thrombocytopenia CKD (chronic kidney disease), stage V Anemia Bipolar 1 disorder HTN (hypertension) Hypothyroidism

## 2018-02-13 PROBLEM — N19 UNSPECIFIED KIDNEY FAILURE: Chronic | Status: ACTIVE | Noted: 2018-02-09

## 2018-02-13 LAB
ANION GAP SERPL CALC-SCNC: 18 MMOL/L — HIGH (ref 5–17)
BUN SERPL-MCNC: 101 MG/DL — HIGH (ref 7–23)
CALCIUM SERPL-MCNC: 8.2 MG/DL — LOW (ref 8.4–10.5)
CHLORIDE SERPL-SCNC: 108 MMOL/L — SIGNIFICANT CHANGE UP (ref 96–108)
CO2 SERPL-SCNC: 16 MMOL/L — LOW (ref 22–31)
CREAT SERPL-MCNC: 8.08 MG/DL — HIGH (ref 0.5–1.3)
FERRITIN SERPL-MCNC: 183 NG/ML — SIGNIFICANT CHANGE UP (ref 30–400)
FOLATE SERPL-MCNC: >20 NG/ML — SIGNIFICANT CHANGE UP (ref 4.8–24.2)
GLUCOSE SERPL-MCNC: 86 MG/DL — SIGNIFICANT CHANGE UP (ref 70–99)
HAPTOGLOB SERPL-MCNC: <20 MG/DL — LOW (ref 34–200)
HCT VFR BLD CALC: 22.9 % — LOW (ref 39–50)
HGB BLD-MCNC: 7.9 G/DL — LOW (ref 13–17)
IGA FLD-MCNC: 107 MG/DL — SIGNIFICANT CHANGE UP (ref 68–378)
IGG FLD-MCNC: 1830 MG/DL — HIGH (ref 694–1618)
IGM SERPL-MCNC: 56 MG/DL — SIGNIFICANT CHANGE UP (ref 40–230)
IRON SATN MFR SERPL: 23 % — SIGNIFICANT CHANGE UP (ref 16–55)
IRON SATN MFR SERPL: 50 UG/DL — SIGNIFICANT CHANGE UP (ref 45–165)
KAPPA LC SER QL IFE: 20.6 MG/DL — HIGH (ref 0.33–1.94)
KAPPA/LAMBDA FREE LIGHT CHAIN RATIO, SERUM: 1.39 RATIO — SIGNIFICANT CHANGE UP (ref 0.26–1.65)
LAMBDA LC SER QL IFE: 14.8 MG/DL — HIGH (ref 0.57–2.63)
LDH SERPL L TO P-CCNC: 289 U/L — HIGH (ref 50–242)
MCHC RBC-ENTMCNC: 29.9 PG — SIGNIFICANT CHANGE UP (ref 27–34)
MCHC RBC-ENTMCNC: 34.5 GM/DL — SIGNIFICANT CHANGE UP (ref 32–36)
MCV RBC AUTO: 86.7 FL — SIGNIFICANT CHANGE UP (ref 80–100)
PLATELET # BLD AUTO: 79 K/UL — LOW (ref 150–400)
POTASSIUM SERPL-MCNC: 4.3 MMOL/L — SIGNIFICANT CHANGE UP (ref 3.5–5.3)
POTASSIUM SERPL-SCNC: 4.3 MMOL/L — SIGNIFICANT CHANGE UP (ref 3.5–5.3)
PROT SERPL-MCNC: 7.2 G/DL — SIGNIFICANT CHANGE UP (ref 6–8.3)
PROT SERPL-MCNC: 7.2 G/DL — SIGNIFICANT CHANGE UP (ref 6–8.3)
RBC # BLD: 2.64 M/UL — LOW (ref 4.2–5.8)
RBC # BLD: 2.64 M/UL — LOW (ref 4.2–5.8)
RBC # FLD: 16.1 % — HIGH (ref 10.3–14.5)
RETICS #: 57.1 K/UL — SIGNIFICANT CHANGE UP (ref 25–125)
RETICS/RBC NFR: 2.2 % — SIGNIFICANT CHANGE UP (ref 0.5–2.5)
SODIUM SERPL-SCNC: 142 MMOL/L — SIGNIFICANT CHANGE UP (ref 135–145)
TIBC SERPL-MCNC: 216 UG/DL — LOW (ref 220–430)
UIBC SERPL-MCNC: 166 UG/DL — SIGNIFICANT CHANGE UP (ref 110–370)
VIT B12 SERPL-MCNC: >2000 PG/ML — HIGH (ref 232–1245)
WBC # BLD: 4.57 K/UL — SIGNIFICANT CHANGE UP (ref 3.8–10.5)
WBC # FLD AUTO: 4.57 K/UL — SIGNIFICANT CHANGE UP (ref 3.8–10.5)

## 2018-02-13 PROCEDURE — 99232 SBSQ HOSP IP/OBS MODERATE 35: CPT

## 2018-02-13 PROCEDURE — 99233 SBSQ HOSP IP/OBS HIGH 50: CPT | Mod: GC

## 2018-02-13 RX ORDER — SODIUM CHLORIDE 0.65 %
1 AEROSOL, SPRAY (ML) NASAL THREE TIMES A DAY
Qty: 0 | Refills: 0 | Status: DISCONTINUED | OUTPATIENT
Start: 2018-02-13 | End: 2018-02-15

## 2018-02-13 RX ADMIN — Medication 100 MILLIGRAM(S): at 14:28

## 2018-02-13 RX ADMIN — Medication 1300 MILLIGRAM(S): at 18:49

## 2018-02-13 RX ADMIN — Medication 1 APPLICATION(S): at 18:49

## 2018-02-13 RX ADMIN — AMLODIPINE BESYLATE 5 MILLIGRAM(S): 2.5 TABLET ORAL at 18:51

## 2018-02-13 RX ADMIN — Medication 40 MILLIGRAM(S): at 05:12

## 2018-02-13 RX ADMIN — SODIUM CHLORIDE 3 MILLILITER(S): 9 INJECTION INTRAMUSCULAR; INTRAVENOUS; SUBCUTANEOUS at 21:10

## 2018-02-13 RX ADMIN — Medication 8 MILLIGRAM(S): at 21:13

## 2018-02-13 RX ADMIN — Medication 100 MILLIGRAM(S): at 09:43

## 2018-02-13 RX ADMIN — Medication 75 MICROGRAM(S): at 05:11

## 2018-02-13 RX ADMIN — CALCITRIOL 0.25 MICROGRAM(S): 0.5 CAPSULE ORAL at 12:32

## 2018-02-13 RX ADMIN — AMLODIPINE BESYLATE 5 MILLIGRAM(S): 2.5 TABLET ORAL at 05:11

## 2018-02-13 RX ADMIN — Medication 1 APPLICATION(S): at 05:11

## 2018-02-13 RX ADMIN — OXYMETAZOLINE HYDROCHLORIDE 1 SPRAY(S): 0.5 SPRAY NASAL at 05:11

## 2018-02-13 RX ADMIN — Medication 1 SPRAY(S): at 21:13

## 2018-02-13 RX ADMIN — SODIUM CHLORIDE 3 MILLILITER(S): 9 INJECTION INTRAMUSCULAR; INTRAVENOUS; SUBCUTANEOUS at 14:00

## 2018-02-13 RX ADMIN — Medication 25 MILLIGRAM(S): at 05:11

## 2018-02-13 RX ADMIN — Medication 1300 MILLIGRAM(S): at 05:11

## 2018-02-13 RX ADMIN — Medication 150 MILLIGRAM(S): at 21:12

## 2018-02-13 RX ADMIN — Medication 25 MILLIGRAM(S): at 14:28

## 2018-02-13 RX ADMIN — Medication 25 MILLIGRAM(S): at 21:13

## 2018-02-13 RX ADMIN — CINACALCET 90 MILLIGRAM(S): 30 TABLET, FILM COATED ORAL at 12:32

## 2018-02-13 RX ADMIN — Medication 200 MICROGRAM(S): at 05:11

## 2018-02-13 RX ADMIN — ARIPIPRAZOLE 20 MILLIGRAM(S): 15 TABLET ORAL at 12:32

## 2018-02-13 NOTE — PROGRESS NOTE ADULT - PROBLEM SELECTOR PLAN 3
BP still elevated. Can titrate up dose of hydralazine as needed. Goal BP is less than 140/90 mm Hg. Monitor BP. BP still elevated. Can titrate up dose of hydralazine as needed or add labetalol. Goal BP is less than 140/90 mm Hg. Monitor BP.

## 2018-02-13 NOTE — PROGRESS NOTE ADULT - PROBLEM SELECTOR PLAN 2
In setting of epistaxis and renal failure: Hb is 7.9 today. Hem/Onc on board; patient scheduled for BM biopsy today. In setting of epistaxis and renal failure: Hb is 7.9 today. Hem/Onc on board; patient scheduled for BM biopsy

## 2018-02-13 NOTE — PROGRESS NOTE ADULT - SUBJECTIVE AND OBJECTIVE BOX
[facility]    Sada Trinidad, Anne, Dc, Liliya, Iftikhar, Winsome      [phone]    CHIEF COMPLAINT: Patient is a 57y old  Male who presents with a chief complaint of nose bleeds (12 Feb 2018 10:54)      Follow Up: ckd, htn, anemia    Interim history: The patient reports no new symptoms.  Denies chest discomfort and shortness of breath.  No abdominal pain.  No new neurologic symptoms.      MEDICATIONS  (STANDING):  amLODIPine   Tablet 5 milliGRAM(s) Oral two times a day  ARIPiprazole 20 milliGRAM(s) Oral daily  BACItracin   Ointment 1 Application(s) Topical two times a day  calcitriol   Capsule 0.25 MICROGram(s) Oral daily  chlorproMAZINE    Tablet 100 milliGRAM(s) Oral <User Schedule>  chlorproMAZINE    Tablet 150 milliGRAM(s) Oral at bedtime  cinacalcet 90 milliGRAM(s) Oral daily  doxazosin 8 milliGRAM(s) Oral at bedtime  furosemide    Tablet 40 milliGRAM(s) Oral daily  hydrALAZINE 25 milliGRAM(s) Oral three times a day  HYDROmorphone  Injectable 1 milliGRAM(s) IV Push once  levothyroxine 200 MICROGram(s) Oral daily  levothyroxine 75 MICROGram(s) Oral daily  sodium bicarbonate 1300 milliGRAM(s) Oral two times a day  sodium chloride 0.9% lock flush 3 milliLiter(s) IV Push every 8 hours    MEDICATIONS  (PRN):  acetaminophen   Tablet. 650 milliGRAM(s) Oral every 6 hours PRN pain or fever  sodium chloride 0.65% Nasal 1 Spray(s) Both Nostrils three times a day PRN Nasal Congestion      REVIEW OF SYSTEMS:  eye, ent, GI, , allergic, dermatologic, musculoskeletal and neurologic are negative except as described above    Vital Signs Last 24 Hrs  T(C): 36.8 (13 Feb 2018 04:40), Max: 36.8 (13 Feb 2018 04:40)  T(F): 98.2 (13 Feb 2018 04:40), Max: 98.2 (13 Feb 2018 04:40)  HR: 82 (13 Feb 2018 04:40) (75 - 90)  BP: 148/82 (13 Feb 2018 04:40) (148/82 - 169/92)  BP(mean): --  RR: 18 (13 Feb 2018 04:40) (18 - 18)  SpO2: 99% (13 Feb 2018 04:40) (99% - 100%)    I&O's Summary    12 Feb 2018 07:01  -  13 Feb 2018 07:00  --------------------------------------------------------  IN: 760 mL / OUT: 0 mL / NET: 760 mL        Telemetry past 24h:    PHYSICAL EXAM:    Constitutional: well-nourished, well-developed, NAD   HEENT:  MMM, sclerae anicteric, conjunctivae clear, no oral cyanosis.  Pulmonary: Non-labored, breath sounds are clear bilaterally, No wheezing, rales or rhonchi  Cardiovascular: Regular, S1 and S2.  No murmur.  No rubs, gallops or clicks  Gastrointestinal: Bowel Sounds present, soft, nontender.   Lymph: 1-2+ peripheral edema.   Neurological: Alert, no focal deficits  Skin: No rashes.  Psych:  Mood & affect appropriate    LABS: All Labs Reviewed:                        7.2    4.72  )-----------( 77       ( 12 Feb 2018 07:30 )             21.1                         7.2    5.02  )-----------( 80       ( 11 Feb 2018 21:35 )             21.4                         7.7    4.9   )-----------( 79       ( 11 Feb 2018 06:45 )             21.2     12 Feb 2018 07:41    139    |  106    |  98     ----------------------------<  87     4.3     |  14     |  7.97   11 Feb 2018 08:39    145    |  111    |  97     ----------------------------<  93     4.8     |  15     |  7.99   10 Feb 2018 09:30    140    |  105    |  92     ----------------------------<  82     4.5     |  14     |  7.56     Ca    8.4        12 Feb 2018 07:41  Ca    8.6        11 Feb 2018 08:39  Ca    8.5        10 Feb 2018 09:30  Mg     2.0       10 Feb 2018 09:30    TPro  7.1    /  Alb  3.3    /  TBili  0.4    /  DBili  x      /  AST  26     /  ALT  46     /  AlkPhos  83     10 Feb 2018 09:30          Blood Culture:     02-10 @ 09:30  TSH: 6.91      RADIOLOGY:    EKG:    Echo:

## 2018-02-13 NOTE — PROGRESS NOTE ADULT - PROBLEM SELECTOR PLAN 1
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 On review of previous labs in [employer], last Scr. is 5.59 on 12/11/18. Latest Scr. is 8.08. Patient is currently not uremic or in fluid overload. No urgent indication for HD today. Continue to monitor Scr. and electrolytes. Monitor BMP daily.

## 2018-02-13 NOTE — PROGRESS NOTE ADULT - SUBJECTIVE AND OBJECTIVE BOX
[facility] Division of Kidney Diseases & Hypertension  FOLLOW UP NOTE  [phone]--------------------------------------------------------------------------------    56 yo M with PMH of bipolar 1, CKD stage V,  anemia, HTN, hypothyroidism admitted for epistaxis and symptomatic anemia. Patient had multiple blood transfusions during hospitalization. Currently patient denies further episodes of epistaxis. He denies complains of SOB, CP, abdominal pain, nausea, vomiting.     PAST HISTORY  --------------------------------------------------------------------------------  No significant changes to PMH, PSH, FHx, SHx, unless otherwise noted    ALLERGIES & MEDICATIONS  --------------------------------------------------------------------------------  Allergies    No Known Allergies    Intolerances      Standing Inpatient Medications  amLODIPine   Tablet 5 milliGRAM(s) Oral two times a day  ARIPiprazole 20 milliGRAM(s) Oral daily  BACItracin   Ointment 1 Application(s) Topical two times a day  calcitriol   Capsule 0.25 MICROGram(s) Oral daily  chlorproMAZINE    Tablet 100 milliGRAM(s) Oral <User Schedule>  chlorproMAZINE    Tablet 150 milliGRAM(s) Oral at bedtime  cinacalcet 90 milliGRAM(s) Oral daily  doxazosin 8 milliGRAM(s) Oral at bedtime  furosemide    Tablet 40 milliGRAM(s) Oral daily  hydrALAZINE 25 milliGRAM(s) Oral three times a day  HYDROmorphone  Injectable 1 milliGRAM(s) IV Push once  levothyroxine 200 MICROGram(s) Oral daily  levothyroxine 75 MICROGram(s) Oral daily  sodium bicarbonate 1300 milliGRAM(s) Oral two times a day  sodium chloride 0.9% lock flush 3 milliLiter(s) IV Push every 8 hours    PRN Inpatient Medications  acetaminophen   Tablet. 650 milliGRAM(s) Oral every 6 hours PRN  sodium chloride 0.65% Nasal 1 Spray(s) Both Nostrils three times a day PRN      REVIEW OF SYSTEMS  --------------------------------------------------------------------------------  Gen: No  fevers/chills  Skin: No rashes  Head/Eyes/Ears/Mouth: No headache; Normal hearing; Normal vision w/o blurriness  Respiratory: No dyspnea, cough, wheezing, hemoptysis  CV: No chest pain, PND, orthopnea  GI: No abdominal pain, diarrhea, constipation, nausea, vomiting  : No increased frequency, dysuria, hematuria, nocturia  MSK: No joint pain/swelling; no back pain; no edema  Neuro: No dizziness/lightheadedness, weakness, seizures, numbness, tingling      All other systems were reviewed and are negative, except as noted.    VITALS/PHYSICAL EXAM  --------------------------------------------------------------------------------  T(C): 36.8 (02-13-18 @ 04:40), Max: 36.8 (02-13-18 @ 04:40)  HR: 82 (02-13-18 @ 04:40) (75 - 83)  BP: 148/82 (02-13-18 @ 04:40) (148/82 - 169/92)  RR: 18 (02-13-18 @ 04:40) (18 - 18)  SpO2: 99% (02-13-18 @ 04:40) (99% - 100%)  Wt(kg): --        02-12-18 @ 07:01  -  02-13-18 @ 07:00  --------------------------------------------------------  IN: 760 mL / OUT: 0 mL / NET: 760 mL    02-13-18 @ 07:01  -  02-13-18 @ 12:54  --------------------------------------------------------  IN: 100 mL / OUT: 0 mL / NET: 100 mL      Physical Exam:  	Gen: NAD, well-appearing  	HEENT: PERRL, supple neck, clear oropharynx  	Pulm: CTA B/L  	CV: RRR, S1S2;  	Back: No spinal or CVA tenderness  	Abd: +BS, soft, nontender/nondistended  	: No suprapubic tenderness                      Extremities: no bilateral LE edema noted.                       Neuro: No focal deficits, intact gait  	Skin: Warm, without rashes  	Vascular access:    LABS/STUDIES  --------------------------------------------------------------------------------              7.9    4.57  >-----------<  79       [02-13-18 @ 10:49]              22.9     142  |  108  |  101  ----------------------------<  86      [02-13-18 @ 10:33]  4.3   |  16  |  8.08        Ca     8.2     [02-13-18 @ 10:33]    TPro  7.2  /  Alb  x   /  TBili  x   /  DBili  x   /  AST  x   /  ALT  x   /  AlkPhos  x   [02-13-18 @ 10:45]              [02-13-18 @ 10:33]    Creatinine Trend:  SCr 8.08 [02-13 @ 10:33]  SCr 7.97 [02-12 @ 07:41]  SCr 7.99 [02-11 @ 08:39]  SCr 7.56 [02-10 @ 09:30]  SCr 7.35 [02-09 @ 13:12]        Iron 50, TIBC 216, %sat 23      [02-13-18 @ 10:33]  Ferritin 183.0      [02-13-18 @ 10:33]  TSH 6.91      [02-10-18 @ 09:30] [facility] Division of Kidney Diseases & Hypertension  FOLLOW UP NOTE  [phone]--------------------------------------------------------------------------------    58 yo M with PMH of bipolar 1, CKD stage V,  anemia, HTN, hypothyroidism admitted for epistaxis and symptomatic anemia. Patient had multiple blood transfusions during hospitalization. Currently patient denies further episodes of epistaxis. He denies complains of SOB, CP, abdominal pain, nausea, vomiting.     PAST HISTORY  --------------------------------------------------------------------------------  No significant changes to PMH, PSH, FHx, SHx, unless otherwise noted    ALLERGIES & MEDICATIONS  --------------------------------------------------------------------------------  Allergies    No Known Allergies    Intolerances      Standing Inpatient Medications  amLODIPine   Tablet 5 milliGRAM(s) Oral two times a day  ARIPiprazole 20 milliGRAM(s) Oral daily  BACItracin   Ointment 1 Application(s) Topical two times a day  calcitriol   Capsule 0.25 MICROGram(s) Oral daily  chlorproMAZINE    Tablet 100 milliGRAM(s) Oral <User Schedule>  chlorproMAZINE    Tablet 150 milliGRAM(s) Oral at bedtime  cinacalcet 90 milliGRAM(s) Oral daily  doxazosin 8 milliGRAM(s) Oral at bedtime  furosemide    Tablet 40 milliGRAM(s) Oral daily  hydrALAZINE 25 milliGRAM(s) Oral three times a day  HYDROmorphone  Injectable 1 milliGRAM(s) IV Push once  levothyroxine 200 MICROGram(s) Oral daily  levothyroxine 75 MICROGram(s) Oral daily  sodium bicarbonate 1300 milliGRAM(s) Oral two times a day  sodium chloride 0.9% lock flush 3 milliLiter(s) IV Push every 8 hours    PRN Inpatient Medications  acetaminophen   Tablet. 650 milliGRAM(s) Oral every 6 hours PRN  sodium chloride 0.65% Nasal 1 Spray(s) Both Nostrils three times a day PRN      REVIEW OF SYSTEMS  --------------------------------------------------------------------------------  Gen: No  fevers/chills  Head/Eyes/Ears/Mouth: No headache  Respiratory: No dyspnea  CV: No chest pain  GI: No abdominal pain, nausea, vomiting  MSK:  no edema      VITALS/PHYSICAL EXAM  --------------------------------------------------------------------------------  T(C): 36.8 (02-13-18 @ 04:40), Max: 36.8 (02-13-18 @ 04:40)  HR: 82 (02-13-18 @ 04:40) (75 - 83)  BP: 148/82 (02-13-18 @ 04:40) (148/82 - 169/92)  RR: 18 (02-13-18 @ 04:40) (18 - 18)  SpO2: 99% (02-13-18 @ 04:40) (99% - 100%)  Wt(kg): --        02-12-18 @ 07:01  -  02-13-18 @ 07:00  --------------------------------------------------------  IN: 760 mL / OUT: 0 mL / NET: 760 mL    02-13-18 @ 07:01  -  02-13-18 @ 12:54  --------------------------------------------------------  IN: 100 mL / OUT: 0 mL / NET: 100 mL      Physical Exam:  	              Gen: NAD  	HEENT: sclera anicteric, no JVD  	Pulm: CTA B/L  	CV:  S1S2 no JVD  	Abd: +BS, soft   	Ext: No B/L Lower ext edema  	Neuro: No focal deficits               Skin: Warm and dry                 Vascular: lou ROBERTSON    LABS/STUDIES  --------------------------------------------------------------------------------              7.9    4.57  >-----------<  79       [02-13-18 @ 10:49]              22.9     142  |  108  |  101  ----------------------------<  86      [02-13-18 @ 10:33]  4.3   |  16  |  8.08        Ca     8.2     [02-13-18 @ 10:33]    TPro  7.2  /  Alb  x   /  TBili  x   /  DBili  x   /  AST  x   /  ALT  x   /  AlkPhos  x   [02-13-18 @ 10:45]              [02-13-18 @ 10:33]    Creatinine Trend:  SCr 8.08 [02-13 @ 10:33]  SCr 7.97 [02-12 @ 07:41]  SCr 7.99 [02-11 @ 08:39]  SCr 7.56 [02-10 @ 09:30]  SCr 7.35 [02-09 @ 13:12]        Iron 50, TIBC 216, %sat 23      [02-13-18 @ 10:33]  Ferritin 183.0      [02-13-18 @ 10:33]  TSH 6.91      [02-10-18 @ 09:30] [facility] Division of Kidney Diseases & Hypertension  FOLLOW UP NOTE  [phone]--------------------------------------------------------------------------------    58 yo M with PMH of bipolar 1, CKD stage V,  anemia, HTN, hypothyroidism admitted for epistaxis and symptomatic anemia. Patient had multiple blood transfusions during hospitalization. Currently patient denies further episodes of epistaxis. He denies complains of SOB, CP, abdominal pain, nausea, vomiting.     PAST HISTORY  --------------------------------------------------------------------------------  No significant changes to PMH, PSH, FHx, SHx, unless otherwise noted    ALLERGIES & MEDICATIONS  --------------------------------------------------------------------------------  Allergies    No Known Allergies    Intolerances      Standing Inpatient Medications  amLODIPine   Tablet 5 milliGRAM(s) Oral two times a day  ARIPiprazole 20 milliGRAM(s) Oral daily  BACItracin   Ointment 1 Application(s) Topical two times a day  calcitriol   Capsule 0.25 MICROGram(s) Oral daily  chlorproMAZINE    Tablet 100 milliGRAM(s) Oral <User Schedule>  chlorproMAZINE    Tablet 150 milliGRAM(s) Oral at bedtime  cinacalcet 90 milliGRAM(s) Oral daily  doxazosin 8 milliGRAM(s) Oral at bedtime  furosemide    Tablet 40 milliGRAM(s) Oral daily  hydrALAZINE 25 milliGRAM(s) Oral three times a day  HYDROmorphone  Injectable 1 milliGRAM(s) IV Push once  levothyroxine 200 MICROGram(s) Oral daily  levothyroxine 75 MICROGram(s) Oral daily  sodium bicarbonate 1300 milliGRAM(s) Oral two times a day  sodium chloride 0.9% lock flush 3 milliLiter(s) IV Push every 8 hours    PRN Inpatient Medications  acetaminophen   Tablet. 650 milliGRAM(s) Oral every 6 hours PRN  sodium chloride 0.65% Nasal 1 Spray(s) Both Nostrils three times a day PRN      REVIEW OF SYSTEMS  --------------------------------------------------------------------------------  Gen: No  fevers/chills  Head/Eyes/Ears/Mouth: No headache  Respiratory: No dyspnea  CV: No chest pain  GI: No abdominal pain, nausea, vomiting  MSK:  no edema      VITALS/PHYSICAL EXAM  --------------------------------------------------------------------------------  T(C): 36.8 (02-13-18 @ 04:40), Max: 36.8 (02-13-18 @ 04:40)  HR: 82 (02-13-18 @ 04:40) (75 - 83)  BP: 148/82 (02-13-18 @ 04:40) (148/82 - 169/92)  RR: 18 (02-13-18 @ 04:40) (18 - 18)  SpO2: 99% (02-13-18 @ 04:40) (99% - 100%)  Wt(kg): --        02-12-18 @ 07:01  -  02-13-18 @ 07:00  --------------------------------------------------------  IN: 760 mL / OUT: 0 mL / NET: 760 mL    02-13-18 @ 07:01  -  02-13-18 @ 12:54  --------------------------------------------------------  IN: 100 mL / OUT: 0 mL / NET: 100 mL      Physical Exam:  	              Gen: NAD  	HEENT: sclera anicteric, no JVD  	Pulm: CTA B/L  	CV:  S1S2 no JVD  	Abd: +BS, soft   	Ext: some B/L Lower ext edema  	Neuro: No focal deficits               Skin: Warm and dry                 Vascular: lou ROBERTSON    LABS/STUDIES  --------------------------------------------------------------------------------              7.9    4.57  >-----------<  79       [02-13-18 @ 10:49]              22.9     142  |  108  |  101  ----------------------------<  86      [02-13-18 @ 10:33]  4.3   |  16  |  8.08        Ca     8.2     [02-13-18 @ 10:33]    TPro  7.2  /  Alb  x   /  TBili  x   /  DBili  x   /  AST  x   /  ALT  x   /  AlkPhos  x   [02-13-18 @ 10:45]              [02-13-18 @ 10:33]    Creatinine Trend:  SCr 8.08 [02-13 @ 10:33]  SCr 7.97 [02-12 @ 07:41]  SCr 7.99 [02-11 @ 08:39]  SCr 7.56 [02-10 @ 09:30]  SCr 7.35 [02-09 @ 13:12]        Iron 50, TIBC 216, %sat 23      [02-13-18 @ 10:33]  Ferritin 183.0      [02-13-18 @ 10:33]  TSH 6.91      [02-10-18 @ 09:30]

## 2018-02-13 NOTE — PROGRESS NOTE ADULT - SUBJECTIVE AND OBJECTIVE BOX
SARAH HENRY  MRN-[id number]    Patient is a 57y old  Male who presents with a chief complaint of nose bleeds (12 Feb 2018 10:54)      Review of System  REVIEW OF SYSTEMS      General:	Denies fatigue, fevers, chills, sweats, decreased appetite.    Skin/Breast: denies pruritis, rash  	  Ophthalmologic: no change in vision or blurring  	  HEENT	Denies dry mouth, oral sores, dysphagia,  change in hearing.    Respiratory and Thorax:  cough, sob, wheeze, hemoptysis  	  Cardiovascular:	no cp , palp, orthopnea    Gastrointestinal:	no n/v/d constipation    Genitourinary:	no dysuria of frequency, no hematuria, no flank pain    Musculoskeletal:	no bone or joint pain. no muscle aches.     Neurological:	no change in sensory or motor function. no headache. no weakness.     Psychiatric:	no depression, no anxiety, insomnia.     Hematology/Lymphatics:	no bleeding or bruising        Current Meds  MEDICATIONS  (STANDING):  amLODIPine   Tablet 5 milliGRAM(s) Oral two times a day  ARIPiprazole 20 milliGRAM(s) Oral daily  BACItracin   Ointment 1 Application(s) Topical two times a day  calcitriol   Capsule 0.25 MICROGram(s) Oral daily  chlorproMAZINE    Tablet 100 milliGRAM(s) Oral <User Schedule>  chlorproMAZINE    Tablet 150 milliGRAM(s) Oral at bedtime  cinacalcet 90 milliGRAM(s) Oral daily  doxazosin 8 milliGRAM(s) Oral at bedtime  furosemide    Tablet 40 milliGRAM(s) Oral daily  hydrALAZINE 25 milliGRAM(s) Oral three times a day  HYDROmorphone  Injectable 1 milliGRAM(s) IV Push once  levothyroxine 200 MICROGram(s) Oral daily  levothyroxine 75 MICROGram(s) Oral daily  sodium bicarbonate 1300 milliGRAM(s) Oral two times a day  sodium chloride 0.9% lock flush 3 milliLiter(s) IV Push every 8 hours    MEDICATIONS  (PRN):  acetaminophen   Tablet. 650 milliGRAM(s) Oral every 6 hours PRN pain or fever  sodium chloride 0.65% Nasal 1 Spray(s) Both Nostrils three times a day PRN Nasal Congestion      Vitals  Vital Signs Last 24 Hrs  T(C): 36.8 (13 Feb 2018 04:40), Max: 36.8 (13 Feb 2018 04:40)  T(F): 98.2 (13 Feb 2018 04:40), Max: 98.2 (13 Feb 2018 04:40)  HR: 82 (13 Feb 2018 04:40) (75 - 90)  BP: 148/82 (13 Feb 2018 04:40) (148/82 - 169/92)  BP(mean): --  RR: 18 (13 Feb 2018 04:40) (18 - 18)  SpO2: 99% (13 Feb 2018 04:40) (99% - 100%)    Physical Exam  PHYSICAL EXAM:      Constitutional: NAD    Eyes: PERRLA EOMI, anicteric sclera    Heent :No oral sores, no pharyngeal injection. moist mucosa.    Neck: supple, no jvd, no LAD    Respiratory: CTA b/l     Cardiovascular: s1s2, no m/g/r    Gastrointestinal: soft, nt, nd, + BS    Extremities: no c/c/e    Neurological:A&O x 3 moves all ext.    Skin: no rash on exposed skin    Lymph Nodes: no lymphadenopathy.              Lab  CBC Full  -  ( 12 Feb 2018 07:30 )  WBC Count : 4.72 K/uL  Hemoglobin : 7.2 g/dL  Hematocrit : 21.1 %  Platelet Count - Automated : 77 K/uL  Mean Cell Volume : 87.6 fl  Mean Cell Hemoglobin : 29.9 pg  Mean Cell Hemoglobin Concentration : 34.1 gm/dL  Auto Neutrophil # : x  Auto Lymphocyte # : x  Auto Monocyte # : x  Auto Eosinophil # : x  Auto Basophil # : x  Auto Neutrophil % : x  Auto Lymphocyte % : x  Auto Monocyte % : x  Auto Eosinophil % : x  Auto Basophil % : x    02-12    139  |  106  |  98<H>  ----------------------------<  87  4.3   |  14<L>  |  7.97<H>    Ca    8.4      12 Feb 2018 07:41          Rad:    Assessment/Plan SARAH HENRY  MRN-[id number]    Patient is a 57y old  Male who presents with a chief complaint of nose bleeds (12 Feb 2018 10:54)      Review of System    General:	Denies fatigue, fevers, chills, sweats, decreased appetite.    Skin/Breast: denies pruritis, rash  	  Ophthalmologic: no change in vision or blurring  	  HEENT	Denies dry mouth, oral sores, dysphagia,  change in hearing.    Respiratory and Thorax:  cough, sob, wheeze, hemoptysis  	  Cardiovascular:	no cp , palp, orthopnea    Gastrointestinal:	no n/v/d constipation    Genitourinary:	no dysuria of frequency, no hematuria, no flank pain    Musculoskeletal:	no bone or joint pain. no muscle aches.     Neurological:	no change in sensory or motor function. no headache. no weakness.     Psychiatric:	no depression, no anxiety, insomnia.     Hematology/Lymphatics:	no bleeding or bruising      Current Meds  MEDICATIONS  (STANDING):  amLODIPine   Tablet 5 milliGRAM(s) Oral two times a day  ARIPiprazole 20 milliGRAM(s) Oral daily  BACItracin   Ointment 1 Application(s) Topical two times a day  calcitriol   Capsule 0.25 MICROGram(s) Oral daily  chlorproMAZINE    Tablet 100 milliGRAM(s) Oral <User Schedule>  chlorproMAZINE    Tablet 150 milliGRAM(s) Oral at bedtime  cinacalcet 90 milliGRAM(s) Oral daily  doxazosin 8 milliGRAM(s) Oral at bedtime  furosemide    Tablet 40 milliGRAM(s) Oral daily  hydrALAZINE 25 milliGRAM(s) Oral three times a day  HYDROmorphone  Injectable 1 milliGRAM(s) IV Push once  levothyroxine 200 MICROGram(s) Oral daily  levothyroxine 75 MICROGram(s) Oral daily  sodium bicarbonate 1300 milliGRAM(s) Oral two times a day  sodium chloride 0.9% lock flush 3 milliLiter(s) IV Push every 8 hours    MEDICATIONS  (PRN):  acetaminophen   Tablet. 650 milliGRAM(s) Oral every 6 hours PRN pain or fever  sodium chloride 0.65% Nasal 1 Spray(s) Both Nostrils three times a day PRN Nasal Congestion      Vitals  Vital Signs Last 24 Hrs  T(C): 36.8 (13 Feb 2018 04:40), Max: 36.8 (13 Feb 2018 04:40)  T(F): 98.2 (13 Feb 2018 04:40), Max: 98.2 (13 Feb 2018 04:40)  HR: 82 (13 Feb 2018 04:40) (75 - 90)  BP: 148/82 (13 Feb 2018 04:40) (148/82 - 169/92)  BP(mean): --  RR: 18 (13 Feb 2018 04:40) (18 - 18)  SpO2: 99% (13 Feb 2018 04:40) (99% - 100%)    Physical Exam    Constitutional: NAD    Eyes: PERRLA EOMI, anicteric sclera    Heent :No oral sores, no pharyngeal injection. moist mucosa.    Neck: supple, no jvd, no LAD    Respiratory: CTA b/l     Cardiovascular: s1s2, no m/g/r    Gastrointestinal: soft, nt, nd, + BS    Extremities: no c/c/e    Neurological:A&O x 3 moves all ext.    Skin: no rash on exposed skin    Lymph Nodes: no lymphadenopathy.      Lab  CBC Full  -  ( 12 Feb 2018 07:30 )  WBC Count : 4.72 K/uL  Hemoglobin : 7.2 g/dL  Hematocrit : 21.1 %  Platelet Count - Automated : 77 K/uL  Mean Cell Volume : 87.6 fl  Mean Cell Hemoglobin : 29.9 pg  Mean Cell Hemoglobin Concentration : 34.1 gm/dL  Auto Neutrophil # : x  Auto Lymphocyte # : x  Auto Monocyte # : x  Auto Eosinophil # : x  Auto Basophil # : x  Auto Neutrophil % : x  Auto Lymphocyte % : x  Auto Monocyte % : x  Auto Eosinophil % : x  Auto Basophil % : x    02-12    139  |  106  |  98<H>  ----------------------------<  87  4.3   |  14<L>  |  7.97<H>    Ca    8.4      12 Feb 2018 07:41          Rad:    Assessment/Plan

## 2018-02-13 NOTE — PROGRESS NOTE ADULT - SUBJECTIVE AND OBJECTIVE BOX
chief complaint : nose bleed        SUBJECTIVE / OVERNIGHT EVENTS: pt denies chest pain, shortness of breath, nausea, vomiting , nose bleed resolved    MEDICATIONS  (STANDING):  amLODIPine   Tablet 5 milliGRAM(s) Oral two times a day  ARIPiprazole 20 milliGRAM(s) Oral daily  BACItracin   Ointment 1 Application(s) Topical two times a day  calcitriol   Capsule 0.25 MICROGram(s) Oral daily  chlorproMAZINE    Tablet 100 milliGRAM(s) Oral <User Schedule>  chlorproMAZINE    Tablet 150 milliGRAM(s) Oral at bedtime  cinacalcet 90 milliGRAM(s) Oral daily  doxazosin 8 milliGRAM(s) Oral at bedtime  furosemide    Tablet 40 milliGRAM(s) Oral daily  hydrALAZINE 25 milliGRAM(s) Oral three times a day  HYDROmorphone  Injectable 1 milliGRAM(s) IV Push once  levothyroxine 200 MICROGram(s) Oral daily  levothyroxine 75 MICROGram(s) Oral daily  sodium bicarbonate 1300 milliGRAM(s) Oral two times a day  sodium chloride 0.65% Nasal 1 Spray(s) Both Nostrils three times a day  sodium chloride 0.9% lock flush 3 milliLiter(s) IV Push every 8 hours    MEDICATIONS  (PRN):  acetaminophen   Tablet. 650 milliGRAM(s) Oral every 6 hours PRN pain or fever    Vital Signs Last 24 Hrs  T(C): 36.8 (13 Feb 2018 14:16), Max: 36.8 (13 Feb 2018 04:40)  T(F): 98.3 (13 Feb 2018 14:16), Max: 98.3 (13 Feb 2018 14:16)  HR: 93 (13 Feb 2018 14:16) (82 - 93)  BP: 156/90 (13 Feb 2018 14:16) (148/82 - 169/92)  BP(mean): --  RR: 18 (13 Feb 2018 14:16) (18 - 18)  SpO2: 99% (13 Feb 2018 14:16) (99% - 100%)    Constitutional: No fever, fatigue  Skin: No rash.  Eyes: No recent vision problems or eye pain.  ENT: No congestion, ear pain, or sore throat.  Cardiovascular: No chest pain or palpation.  Respiratory: No cough, shortness of breath, congestion, or wheezing.  Gastrointestinal: No abdominal pain, nausea, vomiting, or diarrhea.  Genitourinary: No dysuria.  Musculoskeletal: No joint swelling.  Neurologic: No headache.    PHYSICAL EXAM:  GENERAL: NAD  EYES: EOMI, PERRLA  karthik nostril bleeding +  NECK: Supple, No JVD  CHEST/LUNG: dec breath sounds at abses   HEART:  S1 , S2 +  ABDOMEN: soft, bs+  EXTREMITIES:  trace edema  NEUROLOGY: alert awake oriented     LABS:  02-13    142  |  108  |  101<H>  ----------------------------<  86  4.3   |  16<L>  |  8.08<H>    Ca    8.2<L>      13 Feb 2018 10:33    TPro  7.2  /  Alb      /  TBili      /  DBili      /  AST      /  ALT      /  AlkPhos      02-13    Creatinine Trend: 8.08 <--, 7.97 <--, 7.99 <--, 7.56 <--, 7.35 <--                        7.9    4.57  )-----------( 79       ( 13 Feb 2018 10:49 )             22.9     Urine Studies:            LIVER FUNCTIONS - ( 13 Feb 2018 10:45 )  Alb: x     / Pro: 7.2 g/dL / ALK PHOS: x     / ALT: x     / AST: x     / GGT: x

## 2018-02-14 ENCOUNTER — RESULT REVIEW (OUTPATIENT)
Age: 58
End: 2018-02-14

## 2018-02-14 ENCOUNTER — APPOINTMENT (OUTPATIENT)
Dept: CT IMAGING | Facility: HOSPITAL | Age: 58
End: 2018-02-14

## 2018-02-14 LAB
ANION GAP SERPL CALC-SCNC: 19 MMOL/L — HIGH (ref 5–17)
BUN SERPL-MCNC: 103 MG/DL — HIGH (ref 7–23)
CALCIUM SERPL-MCNC: 8.3 MG/DL — LOW (ref 8.4–10.5)
CHLORIDE SERPL-SCNC: 110 MMOL/L — HIGH (ref 96–108)
CO2 SERPL-SCNC: 14 MMOL/L — LOW (ref 22–31)
CREAT SERPL-MCNC: 9.05 MG/DL — HIGH (ref 0.5–1.3)
EPO SERPL-MCNC: 10 MIU/ML — SIGNIFICANT CHANGE UP (ref 2.6–18.5)
GLUCOSE SERPL-MCNC: 89 MG/DL — SIGNIFICANT CHANGE UP (ref 70–99)
HCT VFR BLD CALC: 22.9 % — LOW (ref 39–50)
HGB BLD-MCNC: 7.9 G/DL — LOW (ref 13–17)
INR BLD: 1.16 RATIO — SIGNIFICANT CHANGE UP (ref 0.88–1.16)
MCHC RBC-ENTMCNC: 29.9 PG — SIGNIFICANT CHANGE UP (ref 27–34)
MCHC RBC-ENTMCNC: 34.5 GM/DL — SIGNIFICANT CHANGE UP (ref 32–36)
MCV RBC AUTO: 86.7 FL — SIGNIFICANT CHANGE UP (ref 80–100)
PLATELET # BLD AUTO: 81 K/UL — LOW (ref 150–400)
POTASSIUM SERPL-MCNC: 4.3 MMOL/L — SIGNIFICANT CHANGE UP (ref 3.5–5.3)
POTASSIUM SERPL-SCNC: 4.3 MMOL/L — SIGNIFICANT CHANGE UP (ref 3.5–5.3)
PROTHROM AB SERPL-ACNC: 13.1 SEC — SIGNIFICANT CHANGE UP (ref 10–13.1)
RBC # BLD: 2.64 M/UL — LOW (ref 4.2–5.8)
RBC # FLD: 16.2 % — HIGH (ref 10.3–14.5)
SODIUM SERPL-SCNC: 143 MMOL/L — SIGNIFICANT CHANGE UP (ref 135–145)
WBC # BLD: 5.01 K/UL — SIGNIFICANT CHANGE UP (ref 3.8–10.5)
WBC # FLD AUTO: 5.01 K/UL — SIGNIFICANT CHANGE UP (ref 3.8–10.5)

## 2018-02-14 PROCEDURE — 77012 CT SCAN FOR NEEDLE BIOPSY: CPT | Mod: 26

## 2018-02-14 PROCEDURE — 88313 SPECIAL STAINS GROUP 2: CPT | Mod: 26

## 2018-02-14 PROCEDURE — 88305 TISSUE EXAM BY PATHOLOGIST: CPT | Mod: 26

## 2018-02-14 PROCEDURE — 99232 SBSQ HOSP IP/OBS MODERATE 35: CPT

## 2018-02-14 PROCEDURE — 38222 DX BONE MARROW BX & ASPIR: CPT

## 2018-02-14 PROCEDURE — 88188 FLOWCYTOMETRY/READ 9-15: CPT

## 2018-02-14 PROCEDURE — 85097 BONE MARROW INTERPRETATION: CPT

## 2018-02-14 PROCEDURE — 99233 SBSQ HOSP IP/OBS HIGH 50: CPT | Mod: GC

## 2018-02-14 RX ORDER — DESMOPRESSIN ACETATE 0.1 MG/1
15 TABLET ORAL ONCE
Qty: 0 | Refills: 0 | Status: COMPLETED | OUTPATIENT
Start: 2018-02-14 | End: 2018-02-14

## 2018-02-14 RX ADMIN — Medication 75 MICROGRAM(S): at 06:29

## 2018-02-14 RX ADMIN — Medication 1 SPRAY(S): at 21:51

## 2018-02-14 RX ADMIN — Medication 200 MICROGRAM(S): at 06:29

## 2018-02-14 RX ADMIN — ARIPIPRAZOLE 20 MILLIGRAM(S): 15 TABLET ORAL at 11:50

## 2018-02-14 RX ADMIN — CINACALCET 90 MILLIGRAM(S): 30 TABLET, FILM COATED ORAL at 11:50

## 2018-02-14 RX ADMIN — DESMOPRESSIN ACETATE 215 MICROGRAM(S): 0.1 TABLET ORAL at 13:19

## 2018-02-14 RX ADMIN — Medication 1300 MILLIGRAM(S): at 06:28

## 2018-02-14 RX ADMIN — SODIUM CHLORIDE 3 MILLILITER(S): 9 INJECTION INTRAMUSCULAR; INTRAVENOUS; SUBCUTANEOUS at 21:51

## 2018-02-14 RX ADMIN — SODIUM CHLORIDE 3 MILLILITER(S): 9 INJECTION INTRAMUSCULAR; INTRAVENOUS; SUBCUTANEOUS at 13:20

## 2018-02-14 RX ADMIN — Medication 1 SPRAY(S): at 06:29

## 2018-02-14 RX ADMIN — Medication 1300 MILLIGRAM(S): at 19:17

## 2018-02-14 RX ADMIN — Medication 1 APPLICATION(S): at 06:28

## 2018-02-14 RX ADMIN — Medication 100 MILLIGRAM(S): at 19:17

## 2018-02-14 RX ADMIN — SODIUM CHLORIDE 3 MILLILITER(S): 9 INJECTION INTRAMUSCULAR; INTRAVENOUS; SUBCUTANEOUS at 05:05

## 2018-02-14 RX ADMIN — Medication 8 MILLIGRAM(S): at 21:50

## 2018-02-14 RX ADMIN — Medication 150 MILLIGRAM(S): at 21:50

## 2018-02-14 RX ADMIN — Medication 100 MILLIGRAM(S): at 09:37

## 2018-02-14 RX ADMIN — AMLODIPINE BESYLATE 5 MILLIGRAM(S): 2.5 TABLET ORAL at 21:50

## 2018-02-14 RX ADMIN — Medication 25 MILLIGRAM(S): at 06:28

## 2018-02-14 RX ADMIN — Medication 1 APPLICATION(S): at 21:50

## 2018-02-14 RX ADMIN — Medication 40 MILLIGRAM(S): at 06:26

## 2018-02-14 RX ADMIN — CALCITRIOL 0.25 MICROGRAM(S): 0.5 CAPSULE ORAL at 11:51

## 2018-02-14 RX ADMIN — AMLODIPINE BESYLATE 5 MILLIGRAM(S): 2.5 TABLET ORAL at 06:26

## 2018-02-14 NOTE — PROGRESS NOTE ADULT - PROBLEM SELECTOR PLAN 3
BP still elevated. Can titrate up dose of hydralazine as needed or add labetalol. Goal BP is less than 140/90 mm Hg. Monitor BP. BP still elevated. Can titrate up down hydralazine and increase and add labetalol. Goal BP is less than 140/90 mm Hg. Monitor BP.

## 2018-02-14 NOTE — PROGRESS NOTE ADULT - PROBLEM SELECTOR PLAN 1
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 On review of previous labs in [employer], last Scr. is 5.59 on 12/11/18. Latest Scr. is 905 with BUN of 103. Patient is currently not uremic or in fluid overload. No urgent indication for HD today. Continue to monitor Scr. and electrolytes. Monitor BMP daily.

## 2018-02-14 NOTE — CHART NOTE - NSCHARTNOTEFT_GEN_A_CORE
IR/Heme/Onc requesting dose of DDAVP on call to IR for bone marrow biopsy.  Discussed with renal, Dr. Escalante, who requests that 15mcg IVSS x 1 be given.  Medication ordered.

## 2018-02-14 NOTE — PROGRESS NOTE ADULT - SUBJECTIVE AND OBJECTIVE BOX
[facility] -- Sada Trinidad, Dc Rodríguez Pannella, Patel, Savella  Office # [phone]      Follow Up:  HTN, CKD    Subjective/Observations: Patient seen and examined. Events noted. Resting comfortably in bed. No complaints of chest pain, dyspnea, or palpitations reported. No signs of orthopnea or PND.       REVIEW OF SYSTEMS: All other review of systems is negative unless indicated above    PAST MEDICAL & SURGICAL HISTORY:  Hypothyroidism  Kidney failure  Renal insufficiency  HTN (hypertension)  Bipolar 1 disorder  Anemia  No significant past surgical history      MEDICATIONS  (STANDING):  amLODIPine   Tablet 5 milliGRAM(s) Oral two times a day  ARIPiprazole 20 milliGRAM(s) Oral daily  BACItracin   Ointment 1 Application(s) Topical two times a day  calcitriol   Capsule 0.25 MICROGram(s) Oral daily  chlorproMAZINE    Tablet 100 milliGRAM(s) Oral <User Schedule>  chlorproMAZINE    Tablet 150 milliGRAM(s) Oral at bedtime  cinacalcet 90 milliGRAM(s) Oral daily  desmopressin IVPB 15 MICROGram(s) IV Intermittent once  doxazosin 8 milliGRAM(s) Oral at bedtime  furosemide    Tablet 40 milliGRAM(s) Oral daily  hydrALAZINE 25 milliGRAM(s) Oral three times a day  HYDROmorphone  Injectable 1 milliGRAM(s) IV Push once  levothyroxine 200 MICROGram(s) Oral daily  levothyroxine 75 MICROGram(s) Oral daily  sodium bicarbonate 1300 milliGRAM(s) Oral two times a day  sodium chloride 0.65% Nasal 1 Spray(s) Both Nostrils three times a day  sodium chloride 0.9% lock flush 3 milliLiter(s) IV Push every 8 hours    MEDICATIONS  (PRN):  acetaminophen   Tablet. 650 milliGRAM(s) Oral every 6 hours PRN pain or fever      Allergies    No Known Allergies    Intolerances            Vital Signs Last 24 Hrs  T(C): 36.9 (14 Feb 2018 04:05), Max: 36.9 (14 Feb 2018 04:05)  T(F): 98.5 (14 Feb 2018 04:05), Max: 98.5 (14 Feb 2018 04:05)  HR: 78 (14 Feb 2018 06:20) (78 - 93)  BP: 150/76 (14 Feb 2018 06:20) (150/70 - 160/78)  BP(mean): --  RR: 18 (14 Feb 2018 06:20) (18 - 18)  SpO2: 94% (14 Feb 2018 04:05) (94% - 100%)    I&O's Summary    13 Feb 2018 07:01  -  14 Feb 2018 07:00  --------------------------------------------------------  IN: 910 mL / OUT: 300 mL / NET: 610 mL          PHYSICAL EXAM:     Constitutional: NAD, awake and alert, well-developed  HEENT: Moist Mucous Membranes, Anicteric  Pulmonary: Decreased breath sounds b/l. No rales, crackles or wheeze appreciated.   Cardiovascular: Regular, S1 and S2, No murmurs, rubs, gallops or clicks  Gastrointestinal: Bowel Sounds present, soft, nontender.   Lymph: trace to 1+ lower ext edema. No lymphadenopathy.  Skin: No visible rashes or ulcers.  Psych:  Mood & affect appropriate    LABS: All Labs Reviewed:                        7.9    4.57  )-----------( 79       ( 13 Feb 2018 10:49 )             22.9                         7.2    4.72  )-----------( 77       ( 12 Feb 2018 07:30 )             21.1                         7.2    5.02  )-----------( 80       ( 11 Feb 2018 21:35 )             21.4     14 Feb 2018 07:44    143    |  110    |  103    ----------------------------<  89     4.3     |  14     |  9.05   13 Feb 2018 10:33    142    |  108    |  101    ----------------------------<  86     4.3     |  16     |  8.08   12 Feb 2018 07:41    139    |  106    |  98     ----------------------------<  87     4.3     |  14     |  7.97     Ca    8.3        14 Feb 2018 07:44  Ca    8.2        13 Feb 2018 10:33  Ca    8.4        12 Feb 2018 07:41    TPro  7.2    /  Alb  x      /  TBili  x      /  DBili  x      /  AST  x      /  ALT  x      /  AlkPhos  x      13 Feb 2018 10:45    PT/INR - ( 14 Feb 2018 07:44 )   PT: 13.1 sec;   INR: 1.16 ratio

## 2018-02-14 NOTE — PROGRESS NOTE ADULT - PROBLEM SELECTOR PLAN 2
In setting of epistaxis and renal failure: Hb is 7.9 today. Hem/Onc on board; patient scheduled for BM biopsy

## 2018-02-14 NOTE — PROGRESS NOTE ADULT - SUBJECTIVE AND OBJECTIVE BOX
[facility] Division of Kidney Diseases & Hypertension  FOLLOW UP NOTE  [phone]--------------------------------------------------------------------------------    58 yo M with PMH of bipolar 1, CKD stage V,  anemia, HTN, hypothyroidism admitted for epistaxis and symptomatic anemia. Patient had multiple blood transfusions during hospitalization. Currently patient denies further episodes of epistaxis. He feels more tired than yesterday but denies complains of SOB, CP, abdominal pain, nausea, vomiting.     PAST HISTORY  --------------------------------------------------------------------------------  No significant changes to PMH, PSH, FHx, SHx, unless otherwise noted    ALLERGIES & MEDICATIONS  --------------------------------------------------------------------------------  Allergies    No Known Allergies    Intolerances      Standing Inpatient Medications  amLODIPine   Tablet 5 milliGRAM(s) Oral two times a day  ARIPiprazole 20 milliGRAM(s) Oral daily  BACItracin   Ointment 1 Application(s) Topical two times a day  calcitriol   Capsule 0.25 MICROGram(s) Oral daily  chlorproMAZINE    Tablet 100 milliGRAM(s) Oral <User Schedule>  chlorproMAZINE    Tablet 150 milliGRAM(s) Oral at bedtime  cinacalcet 90 milliGRAM(s) Oral daily  desmopressin IVPB 15 MICROGram(s) IV Intermittent once  doxazosin 8 milliGRAM(s) Oral at bedtime  furosemide    Tablet 40 milliGRAM(s) Oral daily  hydrALAZINE 25 milliGRAM(s) Oral three times a day  HYDROmorphone  Injectable 1 milliGRAM(s) IV Push once  levothyroxine 200 MICROGram(s) Oral daily  levothyroxine 75 MICROGram(s) Oral daily  sodium bicarbonate 1300 milliGRAM(s) Oral two times a day  sodium chloride 0.65% Nasal 1 Spray(s) Both Nostrils three times a day  sodium chloride 0.9% lock flush 3 milliLiter(s) IV Push every 8 hours    PRN Inpatient Medications  acetaminophen   Tablet. 650 milliGRAM(s) Oral every 6 hours PRN      REVIEW OF SYSTEMS  --------------------------------------------------------------------------------  Gen: No  fevers/chills  Head/Eyes/Ears/Mouth: No headache  Respiratory: No dyspnea  CV: No chest pain  GI: No abdominal pain, nausea, vomiting  MSK:  no edema      VITALS/PHYSICAL EXAM  --------------------------------------------------------------------------------  T(C): 36.9 (02-14-18 @ 04:05), Max: 36.9 (02-14-18 @ 04:05)  HR: 78 (02-14-18 @ 06:20) (78 - 93)  BP: 150/76 (02-14-18 @ 06:20) (150/70 - 160/78)  RR: 18 (02-14-18 @ 06:20) (18 - 18)  SpO2: 94% (02-14-18 @ 04:05) (94% - 100%)  Wt(kg): --        02-13-18 @ 07:01  -  02-14-18 @ 07:00  --------------------------------------------------------  IN: 910 mL / OUT: 300 mL / NET: 610 mL      Physical Exam:                 Gen: NAD  	HEENT: sclera anicteric, no JVD  	Pulm: CTA B/L  	CV:  S1S2 no JVD  	Abd: +BS, soft   	Ext: some B/L Lower ext edema  	Neuro: No focal deficits               Skin: Warm and dry                 Vascular: lou ROBERTSON      LABS/STUDIES  --------------------------------------------------------------------------------              7.9    5.01  >-----------<  81       [02-14-18 @ 07:49]              22.9     143  |  110  |  103  ----------------------------<  89      [02-14-18 @ 07:44]  4.3   |  14  |  9.05        Ca     8.3     [02-14-18 @ 07:44]    TPro  7.2  /  Alb  x   /  TBili  x   /  DBili  x   /  AST  x   /  ALT  x   /  AlkPhos  x   [02-13-18 @ 10:45]    PT/INR: PT 13.1 , INR 1.16       [02-14-18 @ 07:44]          [02-13-18 @ 10:33]    Creatinine Trend:  SCr 9.05 [02-14 @ 07:44]  SCr 8.08 [02-13 @ 10:33]  SCr 7.97 [02-12 @ 07:41]  SCr 7.99 [02-11 @ 08:39]  SCr 7.56 [02-10 @ 09:30]        Iron 50, TIBC 216, %sat 23      [02-13-18 @ 10:33]  Ferritin 183.0      [02-13-18 @ 10:33]  TSH 6.91      [02-10-18 @ 09:30]      Free Light Chains: kappa 20.60, lambda 14.80, ratio = 1.39      [02-13 @ 10:45]

## 2018-02-14 NOTE — PROGRESS NOTE ADULT - SUBJECTIVE AND OBJECTIVE BOX
chief complaint : nose bleed        SUBJECTIVE / OVERNIGHT EVENTS: pt denies chest pain, shortness of breath, nausea, vomiting , nose bleed resolved    MEDICATIONS  (STANDING):  amLODIPine   Tablet 5 milliGRAM(s) Oral two times a day  ARIPiprazole 20 milliGRAM(s) Oral daily  BACItracin   Ointment 1 Application(s) Topical two times a day  calcitriol   Capsule 0.25 MICROGram(s) Oral daily  chlorproMAZINE    Tablet 100 milliGRAM(s) Oral <User Schedule>  chlorproMAZINE    Tablet 150 milliGRAM(s) Oral at bedtime  cinacalcet 90 milliGRAM(s) Oral daily  doxazosin 8 milliGRAM(s) Oral at bedtime  furosemide    Tablet 40 milliGRAM(s) Oral daily  HYDROmorphone  Injectable 1 milliGRAM(s) IV Push once  levothyroxine 200 MICROGram(s) Oral daily  levothyroxine 75 MICROGram(s) Oral daily  sodium bicarbonate 1300 milliGRAM(s) Oral two times a day  sodium chloride 0.65% Nasal 1 Spray(s) Both Nostrils three times a day  sodium chloride 0.9% lock flush 3 milliLiter(s) IV Push every 8 hours    MEDICATIONS  (PRN):  acetaminophen   Tablet. 650 milliGRAM(s) Oral every 6 hours PRN pain or fever    Vital Signs Last 24 Hrs  T(C): 36.8 (14 Feb 2018 18:00), Max: 36.9 (14 Feb 2018 04:05)  T(F): 98.2 (14 Feb 2018 18:00), Max: 98.5 (14 Feb 2018 04:05)  HR: 88 (14 Feb 2018 18:30) (78 - 90)  BP: 148/82 (14 Feb 2018 18:30) (148/82 - 157/90)  BP(mean): --  RR: 18 (14 Feb 2018 18:30) (18 - 18)  SpO2: 100% (14 Feb 2018 18:30) (94% - 100%)    Constitutional: No fever, fatigue  Skin: No rash.  Eyes: No recent vision problems or eye pain.  ENT: No congestion, ear pain, or sore throat.  Cardiovascular: No chest pain or palpation.  Respiratory: No cough, shortness of breath, congestion, or wheezing.  Gastrointestinal: No abdominal pain, nausea, vomiting, or diarrhea.  Genitourinary: No dysuria.  Musculoskeletal: No joint swelling.  Neurologic: No headache.    PHYSICAL EXAM:  GENERAL: NAD  EYES: EOMI, PERRLA  karthik nostril bleeding +  NECK: Supple, No JVD  CHEST/LUNG: dec breath sounds at abses   HEART:  S1 , S2 +  ABDOMEN: soft, bs+  EXTREMITIES:  trace edema  NEUROLOGY: alert awake oriented     LABS:  02-14    143  |  110<H>  |  103<H>  ----------------------------<  89  4.3   |  14<L>  |  9.05<H>    Ca    8.3<L>      14 Feb 2018 07:44    TPro  7.2  /  Alb      /  TBili      /  DBili      /  AST      /  ALT      /  AlkPhos      02-13    Creatinine Trend: 9.05 <--, 8.08 <--, 7.97 <--, 7.99 <--, 7.56 <--, 7.35 <--                        7.9    5.01  )-----------( 81       ( 14 Feb 2018 07:49 )             22.9     Urine Studies:            LIVER FUNCTIONS - ( 13 Feb 2018 10:45 )  Alb: x     / Pro: 7.2 g/dL / ALK PHOS: x     / ALT: x     / AST: x     / GGT: x           PT/INR - ( 14 Feb 2018 07:44 )   PT: 13.1 sec;   INR: 1.16 ratio

## 2018-02-14 NOTE — PROGRESS NOTE ADULT - PROBLEM SELECTOR PLAN 7
continue synthroid 275 mcg
continue synthroid 275 mcg
continue synthroid 275 mcg  check TSH
continue synthroid 275 mcg  check TSH
stable, continue metoprolol and norvasc

## 2018-02-14 NOTE — PROGRESS NOTE ADULT - SUBJECTIVE AND OBJECTIVE BOX
Interventional Radiology Brief- Operative Note    Procedure: CT guided bone marrow biopsy    Operators: Emmanuelle    Anesthesia (type): Sedation administered by anesth attg. 2% lidocaine local    Contrast: none    EBL: none    Findings/Follow up Plan of Care: CT guided right iliac bone marrow biopsy performed. Core and FNA specimens obtained. Pt tolerated procedure without difficulty. Full report to follow.    Specimens Removed: as above    Implants: none    Complications: none    Condition/Disposition: stable / pacu    Please call Interventional Radiology x [phone] with any questions, concerns, or issues.

## 2018-02-14 NOTE — PROGRESS NOTE ADULT - PROBLEM SELECTOR PLAN 2
stable, continue metoprolol and norvasc  hydralazine held likely ? sec Lupus anticoagulant   started on labetalol

## 2018-02-14 NOTE — PROGRESS NOTE ADULT - SUBJECTIVE AND OBJECTIVE BOX
SARAH HENRY  MRN-[id number]    Patient is a 57y old  Male who presents with a chief complaint of nose bleeds (12 Feb 2018 10:54)      Review of System    General:	Denies fatigue, fevers, chills, sweats, decreased appetite.    Skin/Breast: denies pruritis, rash  	  Ophthalmologic: no change in vision or blurring  	  HEENT	Denies dry mouth, oral sores, dysphagia,  change in hearing.    Respiratory and Thorax:  no cough, sob, wheeze, hemoptysis  	  Cardiovascular:	no cp , palp, orthopnea    Gastrointestinal:	no n/v/d constipation    Genitourinary:	no dysuria of frequency, no hematuria, no flank pain    Musculoskeletal:	no bone or joint pain. no muscle aches.     Neurological:	no change in sensory or motor function. no headache. no weakness.     Psychiatric:	no depression, no anxiety, insomnia.     Hematology/Lymphatics:	no bleeding or bruising        Current Meds  MEDICATIONS  (STANDING):  amLODIPine   Tablet 5 milliGRAM(s) Oral two times a day  ARIPiprazole 20 milliGRAM(s) Oral daily  BACItracin   Ointment 1 Application(s) Topical two times a day  calcitriol   Capsule 0.25 MICROGram(s) Oral daily  chlorproMAZINE    Tablet 100 milliGRAM(s) Oral <User Schedule>  chlorproMAZINE    Tablet 150 milliGRAM(s) Oral at bedtime  cinacalcet 90 milliGRAM(s) Oral daily  doxazosin 8 milliGRAM(s) Oral at bedtime  furosemide    Tablet 40 milliGRAM(s) Oral daily  hydrALAZINE 25 milliGRAM(s) Oral three times a day  HYDROmorphone  Injectable 1 milliGRAM(s) IV Push once  levothyroxine 200 MICROGram(s) Oral daily  levothyroxine 75 MICROGram(s) Oral daily  sodium bicarbonate 1300 milliGRAM(s) Oral two times a day  sodium chloride 0.65% Nasal 1 Spray(s) Both Nostrils three times a day  sodium chloride 0.9% lock flush 3 milliLiter(s) IV Push every 8 hours    MEDICATIONS  (PRN):  acetaminophen   Tablet. 650 milliGRAM(s) Oral every 6 hours PRN pain or fever      Vitals  Vital Signs Last 24 Hrs  T(C): 36.9 (14 Feb 2018 04:05), Max: 36.9 (14 Feb 2018 04:05)  T(F): 98.5 (14 Feb 2018 04:05), Max: 98.5 (14 Feb 2018 04:05)  HR: 78 (14 Feb 2018 06:20) (78 - 93)  BP: 150/76 (14 Feb 2018 06:20) (150/70 - 160/78)  BP(mean): --  RR: 18 (14 Feb 2018 06:20) (18 - 18)  SpO2: 94% (14 Feb 2018 04:05) (94% - 100%)    Physical Exam    Constitutional: NAD    Eyes: PERRLA EOMI, anicteric sclera    Heent :No oral sores, no pharyngeal injection. moist mucosa.    Neck: supple, no jvd, no LAD    Respiratory: CTA b/l     Cardiovascular: s1s2, no m/g/r    Gastrointestinal: soft, nt, nd, + BS    Extremities: no c/c/e    Neurological:A&O x 3 moves all ext.    Skin: no rash on exposed skin    Lymph Nodes: no lymphadenopathy.      Lab  CBC Full  -  ( 13 Feb 2018 10:49 )  WBC Count : 4.57 K/uL  Hemoglobin : 7.9 g/dL  Hematocrit : 22.9 %  Platelet Count - Automated : 79 K/uL  Mean Cell Volume : 86.7 fl  Mean Cell Hemoglobin : 29.9 pg  Mean Cell Hemoglobin Concentration : 34.5 gm/dL  Auto Neutrophil # : x  Auto Lymphocyte # : x  Auto Monocyte # : x  Auto Eosinophil # : x  Auto Basophil # : x  Auto Neutrophil % : x  Auto Lymphocyte % : x  Auto Monocyte % : x  Auto Eosinophil % : x  Auto Basophil % : x    02-13    142  |  108  |  101<H>  ----------------------------<  86  4.3   |  16<L>  |  8.08<H>    Ca    8.2<L>      13 Feb 2018 10:33    TPro  7.2  /  Alb  x   /  TBili  x   /  DBili  x   /  AST  x   /  ALT  x   /  AlkPhos  x   02-13        Rad:    Assessment/Plan

## 2018-02-14 NOTE — PROGRESS NOTE ADULT - PROBLEM SELECTOR PLAN 4
unclear etiology at this time  heme f/u
unclear etiology at this time  ITP can be caused by chlorpromazine
unclear etiology at this time  heme f/u

## 2018-02-14 NOTE — PROGRESS NOTE ADULT - PROBLEM SELECTOR PLAN 6
stable on medications.  -continue chlorpromazine and abilify as dosed
stable on medications.  -continue chlorpromazine and abilify as dosed  -collateral from pts psychiatrist would be helpful  -check EKG for QTc

## 2018-02-14 NOTE — PROGRESS NOTE ADULT - SUBJECTIVE AND OBJECTIVE BOX
56 yo M with PMH of bipolar 1, CKD (baseline Cr ~6 ) s/p AVF not on dialysis, anemia, HTN, hypothyroidism a/w with epistaxis. Pt found to be thrombocytopenic and anemia IR requested for Bone marrow biopsy.        Allergies:No Known Allergies      PAST MEDICAL & SURGICAL HISTORY:  Hypothyroidism  Kidney failure  Renal insufficiency  HTN (hypertension)  Bipolar 1 disorder  Anemia  No significant past surgical history        Pertinent labs:                      7.9    5.01  )-----------( 81       ( 14 Feb 2018 07:49 )             22.9   02-14    143  |  110<H>  |  103<H>  ----------------------------<  89  4.3   |  14<L>  |  9.05<H>    Ca    8.3<L>      14 Feb 2018 07:44    TPro  7.2  /  Alb  x   /  TBili  x   /  DBili  x   /  AST  x   /  ALT  x   /  AlkPhos  x   02-13  PT/INR - ( 14 Feb 2018 07:44 )   PT: 13.1 sec;   INR: 1.16 ratio           Plan: bone marrow biopsy with image guidance.   Consent: Procedure/risks/ Benefits explained. Pt AO X 3 at the time of encounter Informed consent obtained from sister and patient.  Pt verbalizes understanding.

## 2018-02-14 NOTE — PROGRESS NOTE ADULT - PROBLEM SELECTOR PLAN 5
may be 2/2 antipsychotics. monitor closely

## 2018-02-15 VITALS — HEART RATE: 103 BPM | SYSTOLIC BLOOD PRESSURE: 145 MMHG | DIASTOLIC BLOOD PRESSURE: 87 MMHG

## 2018-02-15 LAB
% ALBUMIN: 47.9 % — SIGNIFICANT CHANGE UP
% ALPHA 1: 5.4 % — SIGNIFICANT CHANGE UP
% ALPHA 2: 6.5 % — SIGNIFICANT CHANGE UP
% BETA: 16.3 % — SIGNIFICANT CHANGE UP
% GAMMA: 23.9 % — SIGNIFICANT CHANGE UP
% M SPIKE: 8.4 % — SIGNIFICANT CHANGE UP
ALBUMIN SERPL ELPH-MCNC: 3.4 G/DL — LOW (ref 3.6–5.5)
ALBUMIN/GLOB SERPL ELPH: 0.9 RATIO — SIGNIFICANT CHANGE UP
ALPHA1 GLOB SERPL ELPH-MCNC: 0.4 G/DL — SIGNIFICANT CHANGE UP (ref 0.1–0.4)
ALPHA2 GLOB SERPL ELPH-MCNC: 0.5 G/DL — SIGNIFICANT CHANGE UP (ref 0.5–1)
ANION GAP SERPL CALC-SCNC: 20 MMOL/L — HIGH (ref 5–17)
B-GLOBULIN SERPL ELPH-MCNC: 1.2 G/DL — HIGH (ref 0.5–1)
BUN SERPL-MCNC: 97 MG/DL — HIGH (ref 7–23)
CALCIUM SERPL-MCNC: 8.4 MG/DL — SIGNIFICANT CHANGE UP (ref 8.4–10.5)
CHLORIDE SERPL-SCNC: 109 MMOL/L — HIGH (ref 96–108)
CO2 SERPL-SCNC: 14 MMOL/L — LOW (ref 22–31)
CREAT SERPL-MCNC: 8.06 MG/DL — HIGH (ref 0.5–1.3)
GAMMA GLOBULIN: 1.7 G/DL — HIGH (ref 0.6–1.6)
GLUCOSE SERPL-MCNC: 83 MG/DL — SIGNIFICANT CHANGE UP (ref 70–99)
HCT VFR BLD CALC: 23.2 % — LOW (ref 39–50)
HGB BLD-MCNC: 8 G/DL — LOW (ref 13–17)
INTERPRETATION SERPL IFE-IMP: SIGNIFICANT CHANGE UP
M-SPIKE: 0.6 G/DL — HIGH (ref 0–0)
MCHC RBC-ENTMCNC: 30.8 PG — SIGNIFICANT CHANGE UP (ref 27–34)
MCHC RBC-ENTMCNC: 34.5 GM/DL — SIGNIFICANT CHANGE UP (ref 32–36)
MCV RBC AUTO: 89.2 FL — SIGNIFICANT CHANGE UP (ref 80–100)
PLATELET # BLD AUTO: 87 K/UL — LOW (ref 150–400)
POTASSIUM SERPL-MCNC: 4.5 MMOL/L — SIGNIFICANT CHANGE UP (ref 3.5–5.3)
POTASSIUM SERPL-SCNC: 4.5 MMOL/L — SIGNIFICANT CHANGE UP (ref 3.5–5.3)
PROT PATTERN SERPL ELPH-IMP: SIGNIFICANT CHANGE UP
RBC # BLD: 2.6 M/UL — LOW (ref 4.2–5.8)
RBC # FLD: 15.9 % — HIGH (ref 10.3–14.5)
SODIUM SERPL-SCNC: 143 MMOL/L — SIGNIFICANT CHANGE UP (ref 135–145)
WBC # BLD: 4.7 K/UL — SIGNIFICANT CHANGE UP (ref 3.8–10.5)
WBC # FLD AUTO: 4.7 K/UL — SIGNIFICANT CHANGE UP (ref 3.8–10.5)

## 2018-02-15 PROCEDURE — 83615 LACTATE (LD) (LDH) ENZYME: CPT

## 2018-02-15 PROCEDURE — 36430 TRANSFUSION BLD/BLD COMPNT: CPT

## 2018-02-15 PROCEDURE — 86923 COMPATIBILITY TEST ELECTRIC: CPT

## 2018-02-15 PROCEDURE — 82668 ASSAY OF ERYTHROPOIETIN: CPT

## 2018-02-15 PROCEDURE — C1830: CPT

## 2018-02-15 PROCEDURE — 85730 THROMBOPLASTIN TIME PARTIAL: CPT

## 2018-02-15 PROCEDURE — 71045 X-RAY EXAM CHEST 1 VIEW: CPT

## 2018-02-15 PROCEDURE — 85610 PROTHROMBIN TIME: CPT

## 2018-02-15 PROCEDURE — 88264 CHROMOSOME ANALYSIS 20-25: CPT

## 2018-02-15 PROCEDURE — 84443 ASSAY THYROID STIM HORMONE: CPT

## 2018-02-15 PROCEDURE — 86901 BLOOD TYPING SEROLOGIC RH(D): CPT

## 2018-02-15 PROCEDURE — 85097 BONE MARROW INTERPRETATION: CPT

## 2018-02-15 PROCEDURE — 88237 TISSUE CULTURE BONE MARROW: CPT

## 2018-02-15 PROCEDURE — 84165 PROTEIN E-PHORESIS SERUM: CPT

## 2018-02-15 PROCEDURE — 88184 FLOWCYTOMETRY/ TC 1 MARKER: CPT

## 2018-02-15 PROCEDURE — 88313 SPECIAL STAINS GROUP 2: CPT

## 2018-02-15 PROCEDURE — 86334 IMMUNOFIX E-PHORESIS SERUM: CPT

## 2018-02-15 PROCEDURE — 88305 TISSUE EXAM BY PATHOLOGIST: CPT

## 2018-02-15 PROCEDURE — 82784 ASSAY IGA/IGD/IGG/IGM EACH: CPT

## 2018-02-15 PROCEDURE — 85045 AUTOMATED RETICULOCYTE COUNT: CPT

## 2018-02-15 PROCEDURE — 85027 COMPLETE CBC AUTOMATED: CPT

## 2018-02-15 PROCEDURE — 93005 ELECTROCARDIOGRAM TRACING: CPT

## 2018-02-15 PROCEDURE — 86900 BLOOD TYPING SEROLOGIC ABO: CPT

## 2018-02-15 PROCEDURE — 80053 COMPREHEN METABOLIC PANEL: CPT

## 2018-02-15 PROCEDURE — 77012 CT SCAN FOR NEEDLE BIOPSY: CPT

## 2018-02-15 PROCEDURE — 99285 EMERGENCY DEPT VISIT HI MDM: CPT | Mod: 25

## 2018-02-15 PROCEDURE — 86850 RBC ANTIBODY SCREEN: CPT

## 2018-02-15 PROCEDURE — P9016: CPT

## 2018-02-15 PROCEDURE — 82746 ASSAY OF FOLIC ACID SERUM: CPT

## 2018-02-15 PROCEDURE — 83735 ASSAY OF MAGNESIUM: CPT

## 2018-02-15 PROCEDURE — 88280 CHROMOSOME KARYOTYPE STUDY: CPT

## 2018-02-15 PROCEDURE — 99232 SBSQ HOSP IP/OBS MODERATE 35: CPT

## 2018-02-15 PROCEDURE — 83010 ASSAY OF HAPTOGLOBIN QUANT: CPT

## 2018-02-15 PROCEDURE — 82607 VITAMIN B-12: CPT

## 2018-02-15 PROCEDURE — 83521 IG LIGHT CHAINS FREE EACH: CPT

## 2018-02-15 PROCEDURE — 80048 BASIC METABOLIC PNL TOTAL CA: CPT

## 2018-02-15 PROCEDURE — 99233 SBSQ HOSP IP/OBS HIGH 50: CPT | Mod: GC

## 2018-02-15 PROCEDURE — 84155 ASSAY OF PROTEIN SERUM: CPT

## 2018-02-15 PROCEDURE — 77074 RADEX OSSEOUS SURVEY LMTD: CPT

## 2018-02-15 PROCEDURE — 82728 ASSAY OF FERRITIN: CPT

## 2018-02-15 PROCEDURE — 88185 FLOWCYTOMETRY/TC ADD-ON: CPT

## 2018-02-15 PROCEDURE — 83550 IRON BINDING TEST: CPT

## 2018-02-15 RX ORDER — METOPROLOL TARTRATE 50 MG
1 TABLET ORAL
Qty: 14 | Refills: 0
Start: 2018-02-15 | End: 2018-02-28

## 2018-02-15 RX ORDER — METOPROLOL TARTRATE 50 MG
100 TABLET ORAL DAILY
Qty: 0 | Refills: 0 | Status: DISCONTINUED | OUTPATIENT
Start: 2018-02-15 | End: 2018-02-15

## 2018-02-15 RX ORDER — FUROSEMIDE 40 MG
1 TABLET ORAL
Qty: 14 | Refills: 0
Start: 2018-02-15 | End: 2018-02-28

## 2018-02-15 RX ORDER — BACITRACIN ZINC 500 UNIT/G
1 OINTMENT IN PACKET (EA) TOPICAL
Qty: 0 | Refills: 0 | DISCHARGE
Start: 2018-02-15

## 2018-02-15 RX ORDER — AMLODIPINE BESYLATE 2.5 MG/1
1 TABLET ORAL
Qty: 0 | Refills: 0 | COMMUNITY

## 2018-02-15 RX ORDER — METOPROLOL TARTRATE 50 MG
1 TABLET ORAL
Qty: 0 | Refills: 0 | COMMUNITY

## 2018-02-15 RX ORDER — SODIUM CHLORIDE 0.65 %
2 AEROSOL, SPRAY (ML) NASAL
Qty: 0 | Refills: 0 | DISCHARGE
Start: 2018-02-15

## 2018-02-15 RX ORDER — ACETAMINOPHEN 500 MG
2 TABLET ORAL
Qty: 0 | Refills: 0 | DISCHARGE
Start: 2018-02-15

## 2018-02-15 RX ADMIN — ARIPIPRAZOLE 20 MILLIGRAM(S): 15 TABLET ORAL at 13:04

## 2018-02-15 RX ADMIN — Medication 1 APPLICATION(S): at 06:07

## 2018-02-15 RX ADMIN — SODIUM CHLORIDE 3 MILLILITER(S): 9 INJECTION INTRAMUSCULAR; INTRAVENOUS; SUBCUTANEOUS at 06:07

## 2018-02-15 RX ADMIN — Medication 1300 MILLIGRAM(S): at 06:07

## 2018-02-15 RX ADMIN — CALCITRIOL 0.25 MICROGRAM(S): 0.5 CAPSULE ORAL at 12:50

## 2018-02-15 RX ADMIN — SODIUM CHLORIDE 3 MILLILITER(S): 9 INJECTION INTRAMUSCULAR; INTRAVENOUS; SUBCUTANEOUS at 13:11

## 2018-02-15 RX ADMIN — Medication 40 MILLIGRAM(S): at 06:07

## 2018-02-15 RX ADMIN — Medication 100 MILLIGRAM(S): at 09:07

## 2018-02-15 RX ADMIN — AMLODIPINE BESYLATE 5 MILLIGRAM(S): 2.5 TABLET ORAL at 06:07

## 2018-02-15 RX ADMIN — Medication 1 SPRAY(S): at 13:08

## 2018-02-15 RX ADMIN — Medication 100 MILLIGRAM(S): at 14:25

## 2018-02-15 RX ADMIN — Medication 75 MICROGRAM(S): at 06:07

## 2018-02-15 RX ADMIN — Medication 200 MICROGRAM(S): at 06:07

## 2018-02-15 RX ADMIN — Medication 1 SPRAY(S): at 06:07

## 2018-02-15 RX ADMIN — CINACALCET 90 MILLIGRAM(S): 30 TABLET, FILM COATED ORAL at 12:50

## 2018-02-15 NOTE — PROGRESS NOTE ADULT - SUBJECTIVE AND OBJECTIVE BOX
[facility] Division of Kidney Diseases & Hypertension  FOLLOW UP NOTE  [phone]--------------------------------------------------------------------------------    58 yo M with PMH of bipolar 1, CKD stage V,  anemia, HTN, hypothyroidism admitted for epistaxis and symptomatic anemia. Patient had multiple blood transfusions during hospitalization and had bone marrow biopsy done yesterday. Currently patient denies further episodes of epistaxis. He feels more tired than yesterday but denies complains of SOB, CP, abdominal pain, nausea, vomiting.         PAST HISTORY  --------------------------------------------------------------------------------  No significant changes to PMH, PSH, FHx, SHx, unless otherwise noted    ALLERGIES & MEDICATIONS  --------------------------------------------------------------------------------  Allergies    No Known Allergies    Intolerances      Standing Inpatient Medications  amLODIPine   Tablet 5 milliGRAM(s) Oral two times a day  ARIPiprazole 20 milliGRAM(s) Oral daily  BACItracin   Ointment 1 Application(s) Topical two times a day  calcitriol   Capsule 0.25 MICROGram(s) Oral daily  chlorproMAZINE    Tablet 100 milliGRAM(s) Oral <User Schedule>  chlorproMAZINE    Tablet 150 milliGRAM(s) Oral at bedtime  cinacalcet 90 milliGRAM(s) Oral daily  doxazosin 8 milliGRAM(s) Oral at bedtime  furosemide    Tablet 40 milliGRAM(s) Oral daily  HYDROmorphone  Injectable 1 milliGRAM(s) IV Push once  levothyroxine 200 MICROGram(s) Oral daily  levothyroxine 75 MICROGram(s) Oral daily  sodium bicarbonate 1300 milliGRAM(s) Oral two times a day  sodium chloride 0.65% Nasal 1 Spray(s) Both Nostrils three times a day  sodium chloride 0.9% lock flush 3 milliLiter(s) IV Push every 8 hours    PRN Inpatient Medications  acetaminophen   Tablet. 650 milliGRAM(s) Oral every 6 hours PRN      REVIEW OF SYSTEMS  --------------------------------------------------------------------------------  Gen: Fatigue +  Head/Eyes/Ears/Mouth: No headache  Respiratory: No dyspnea  CV: No chest pain  GI: No abdominal pain, nausea, vomiting  MSK:  no edema      VITALS/PHYSICAL EXAM  --------------------------------------------------------------------------------  T(C): 37.3 (02-15-18 @ 04:44), Max: 37.3 (02-15-18 @ 04:44)  HR: 93 (02-15-18 @ 04:44) (88 - 93)  BP: 163/90 (02-15-18 @ 04:44) (148/80 - 163/90)  RR: 18 (02-15-18 @ 04:44) (18 - 18)  SpO2: 97% (02-15-18 @ 04:44) (97% - 100%)  Wt(kg): --        02-14-18 @ 07:01  -  02-15-18 @ 07:00  --------------------------------------------------------  IN: 240 mL / OUT: 0 mL / NET: 240 mL      Physical Exam:  	              Gen: NAD  	HEENT: sclera anicteric, no JVD  	Pulm: CTA B/L  	CV:  S1S2 no JVD  	Abd: +BS, soft   	Ext: some B/L Lower ext edema  	Neuro: No focal deficits               Skin: Warm and dry                 Vascular: lue AVF; thrill and bruit heard. skin over AVF intact.     LABS/STUDIES  --------------------------------------------------------------------------------              7.9    5.01  >-----------<  81       [02-14-18 @ 07:49]              22.9     143  |  110  |  103  ----------------------------<  89      [02-14-18 @ 07:44]  4.3   |  14  |  9.05        Ca     8.3     [02-14-18 @ 07:44]    TPro  7.2  /  Alb  x   /  TBili  x   /  DBili  x   /  AST  x   /  ALT  x   /  AlkPhos  x   [02-13-18 @ 10:45]    PT/INR: PT 13.1 , INR 1.16       [02-14-18 @ 07:44]          [02-13-18 @ 10:33]    Creatinine Trend:  SCr 9.05 [02-14 @ 07:44]  SCr 8.08 [02-13 @ 10:33]  SCr 7.97 [02-12 @ 07:41]  SCr 7.99 [02-11 @ 08:39]  SCr 7.56 [02-10 @ 09:30]        Iron 50, TIBC 216, %sat 23      [02-13-18 @ 10:33]  Ferritin 183.0      [02-13-18 @ 10:33]  TSH 6.91      [02-10-18 @ 09:30]      Free Light Chains: kappa 20.60, lambda 14.80, ratio = 1.39      [02-13 @ 10:45]

## 2018-02-15 NOTE — PROGRESS NOTE ADULT - PROBLEM SELECTOR PROBLEM 3
Chronic kidney disease
HTN (hypertension)
Chronic kidney disease
HTN (hypertension)

## 2018-02-15 NOTE — PROGRESS NOTE ADULT - PROBLEM SELECTOR PROBLEM 2
Anemia
Anemia
HTN (hypertension)
Anemia
Generalized weakness
HTN (hypertension)
Anemia

## 2018-02-15 NOTE — PROGRESS NOTE ADULT - PROBLEM SELECTOR PLAN 2
In setting of epistaxis and renal failure: Hb is 7.9 today. Hem/Onc on board; patient scheduled for BM biopsy In setting of epistaxis and renal failure: Hb is 7.9 today. Hem/Onc on board; patient scheduled for BM biopsy.

## 2018-02-15 NOTE — PROGRESS NOTE ADULT - SUBJECTIVE AND OBJECTIVE BOX
[facility] - Sada Trinidad, Anne, Dc, Liliya, Winsome Buitrago  Office Number:  [phone]    Patient resting comfortably in bed in NAD.  Laying flat with no respiratory distress.  No complaints of chest pain, dyspnea, palpitations, PND, or orthopnea.  Plan for HD. Still with lower extremity edema.    ROS: negative unless otherwise mentioned.    Telemetry:  Not on tele    MEDICATIONS  (STANDING):  amLODIPine   Tablet 5 milliGRAM(s) Oral two times a day  ARIPiprazole 20 milliGRAM(s) Oral daily  BACItracin   Ointment 1 Application(s) Topical two times a day  calcitriol   Capsule 0.25 MICROGram(s) Oral daily  chlorproMAZINE    Tablet 100 milliGRAM(s) Oral <User Schedule>  chlorproMAZINE    Tablet 150 milliGRAM(s) Oral at bedtime  cinacalcet 90 milliGRAM(s) Oral daily  doxazosin 8 milliGRAM(s) Oral at bedtime  furosemide    Tablet 40 milliGRAM(s) Oral daily  HYDROmorphone  Injectable 1 milliGRAM(s) IV Push once  levothyroxine 200 MICROGram(s) Oral daily  levothyroxine 75 MICROGram(s) Oral daily  sodium bicarbonate 1300 milliGRAM(s) Oral two times a day  sodium chloride 0.65% Nasal 1 Spray(s) Both Nostrils three times a day  sodium chloride 0.9% lock flush 3 milliLiter(s) IV Push every 8 hours    MEDICATIONS  (PRN):  acetaminophen   Tablet. 650 milliGRAM(s) Oral every 6 hours PRN pain or fever      Allergies    No Known Allergies    Intolerances        Vital Signs Last 24 Hrs  T(C): 37.3 (15 Feb 2018 04:44), Max: 37.3 (15 Feb 2018 04:44)  T(F): 99.1 (15 Feb 2018 04:44), Max: 99.1 (15 Feb 2018 04:44)  HR: 93 (15 Feb 2018 04:44) (88 - 93)  BP: 163/90 (15 Feb 2018 04:44) (148/80 - 163/90)  BP(mean): --  RR: 18 (15 Feb 2018 04:44) (18 - 18)  SpO2: 97% (15 Feb 2018 04:44) (97% - 100%)    I&O's Summary    14 Feb 2018 07:01  -  15 Feb 2018 07:00  --------------------------------------------------------  IN: 240 mL / OUT: 0 mL / NET: 240 mL        ON EXAM:    Constitutional: NAD, awake and alert, well-developed  HEENT: Moist Mucous Membranes, Anicteric  Pulmonary: Decreased breath sounds b/l. No rales, crackles or wheeze appreciated.   Cardiovascular: Regular, S1 and S2, No murmurs, rubs, gallops or clicks  Gastrointestinal: Bowel Sounds present, soft, nontender.   Lymph: trace to 1+ lower ext edema. No lymphadenopathy.  Skin: No visible rashes or ulcers.  Psych:  Mood & affect appropriate    LABS: All Labs Reviewed:                        8.0    4.70  )-----------( 87       ( 15 Feb 2018 09:19 )             23.2                         7.9    5.01  )-----------( 81       ( 14 Feb 2018 07:49 )             22.9                         7.9    4.57  )-----------( 79       ( 13 Feb 2018 10:49 )             22.9     15 Feb 2018 09:24    143    |  109    |  97     ----------------------------<  83     4.5     |  14     |  8.06   14 Feb 2018 07:44    143    |  110    |  103    ----------------------------<  89     4.3     |  14     |  9.05   13 Feb 2018 10:33    142    |  108    |  101    ----------------------------<  86     4.3     |  16     |  8.08     Ca    8.4        15 Feb 2018 09:24  Ca    8.3        14 Feb 2018 07:44  Ca    8.2        13 Feb 2018 10:33    TPro  7.2    /  Alb  x      /  TBili  x      /  DBili  x      /  AST  x      /  ALT  x      /  AlkPhos  x      13 Feb 2018 10:45    PT/INR - ( 14 Feb 2018 07:44 )   PT: 13.1 sec;   INR: 1.16 ratio               Blood Culture:

## 2018-02-15 NOTE — PROGRESS NOTE ADULT - ATTENDING COMMENTS
blood pressure acceptable off hydralazine.  Hgb is stable but not improved after PRBCs.  Given elevated BUN, anemia, bleeding, edema it is prudent to start the patient on hemodialysis.  Dr. Escalante discussed with family. blood pressure acceptable off hydralazine.  Hgb is stable but not improved after PRBCs.  Given elevated BUN, anemia, bleeding, edema it is prudent to start the patient on hemodialysis.  Dr. Escalante discussed with family.    Addendum: after much discussion with family they are very resistant to keeping the patient in the hospital.  At this point dialysis is urgent but not emergent.  I spoke with [facility] nurse Kaci.  We will begin the process of setting up out patient initiation of dialysis.  Patient will have repeat blood work within one week.

## 2018-02-15 NOTE — PROGRESS NOTE ADULT - PROBLEM SELECTOR PLAN 1
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 On review of previous labs in [employer], last Scr. is 5.59 on 12/11/18. Latest Scr. is 9.05 with BUN of 103 done yesterday. No labs available for review yet. Discussed with primary team; in view of worsening BUN and Scr, will initiate on HD while patient is inpatient. Discussed with patient and he agree. Will arrange for first session of HD today. Continue to monitor Scr. and electrolytes. Monitor BMP daily.

## 2018-02-15 NOTE — PROGRESS NOTE ADULT - PROVIDER SPECIALTY LIST ADULT
Cardiology
Heme/Onc
Internal Medicine
Intervent Radiology
Intervent Radiology
Nephrology
Cardiology
Cardiology
Nephrology
Internal Medicine

## 2018-02-15 NOTE — PROGRESS NOTE ADULT - PROBLEM SELECTOR PLAN 3
BP still elevated. Can titrate down hydralazine and add labetalol. Goal BP is less than 140/90 mm Hg. Monitor BP.

## 2018-02-15 NOTE — PROGRESS NOTE ADULT - PROBLEM SELECTOR PROBLEM 1
Anemia
CKD (chronic kidney disease), stage V
Anemia
CKD (chronic kidney disease), stage V

## 2018-02-15 NOTE — PROGRESS NOTE ADULT - SUBJECTIVE AND OBJECTIVE BOX
SARAH HENRY  MRN-[id number]    Patient is a 57y old  Male who presents with a chief complaint of nose bleeds (12 Feb 2018 10:54)      Review of System    Resting comfortably    Current Meds  MEDICATIONS  (STANDING):  amLODIPine   Tablet 5 milliGRAM(s) Oral two times a day  ARIPiprazole 20 milliGRAM(s) Oral daily  BACItracin   Ointment 1 Application(s) Topical two times a day  calcitriol   Capsule 0.25 MICROGram(s) Oral daily  chlorproMAZINE    Tablet 100 milliGRAM(s) Oral <User Schedule>  chlorproMAZINE    Tablet 150 milliGRAM(s) Oral at bedtime  cinacalcet 90 milliGRAM(s) Oral daily  doxazosin 8 milliGRAM(s) Oral at bedtime  furosemide    Tablet 40 milliGRAM(s) Oral daily  HYDROmorphone  Injectable 1 milliGRAM(s) IV Push once  levothyroxine 200 MICROGram(s) Oral daily  levothyroxine 75 MICROGram(s) Oral daily  sodium bicarbonate 1300 milliGRAM(s) Oral two times a day  sodium chloride 0.65% Nasal 1 Spray(s) Both Nostrils three times a day  sodium chloride 0.9% lock flush 3 milliLiter(s) IV Push every 8 hours    MEDICATIONS  (PRN):  acetaminophen   Tablet. 650 milliGRAM(s) Oral every 6 hours PRN pain or fever      Vitals  Vital Signs Last 24 Hrs  T(C): 37.3 (15 Feb 2018 04:44), Max: 37.3 (15 Feb 2018 04:44)  T(F): 99.1 (15 Feb 2018 04:44), Max: 99.1 (15 Feb 2018 04:44)  HR: 93 (15 Feb 2018 04:44) (80 - 93)  BP: 163/90 (15 Feb 2018 04:44) (148/80 - 163/90)  BP(mean): --  RR: 18 (15 Feb 2018 04:44) (18 - 18)  SpO2: 97% (15 Feb 2018 04:44) (97% - 100%)    Physical Exam    Constitutional: NAD    Lab  CBC Full  -  ( 14 Feb 2018 07:49 )  WBC Count : 5.01 K/uL  Hemoglobin : 7.9 g/dL  Hematocrit : 22.9 %  Platelet Count - Automated : 81 K/uL  Mean Cell Volume : 86.7 fl  Mean Cell Hemoglobin : 29.9 pg  Mean Cell Hemoglobin Concentration : 34.5 gm/dL  Auto Neutrophil # : x  Auto Lymphocyte # : x  Auto Monocyte # : x  Auto Eosinophil # : x  Auto Basophil # : x  Auto Neutrophil % : x  Auto Lymphocyte % : x  Auto Monocyte % : x  Auto Eosinophil % : x  Auto Basophil % : x    02-14    143  |  110<H>  |  103<H>  ----------------------------<  89  4.3   |  14<L>  |  9.05<H>    Ca    8.3<L>      14 Feb 2018 07:44    TPro  7.2  /  Alb  x   /  TBili  x   /  DBili  x   /  AST  x   /  ALT  x   /  AlkPhos  x   02-13    PT/INR - ( 14 Feb 2018 07:44 )   PT: 13.1 sec;   INR: 1.16 ratio             Rad:    Assessment/Plan

## 2018-02-15 NOTE — PROGRESS NOTE ADULT - ASSESSMENT
58 yo M bipolar 1, CKD (baseline Cr ~6 ), anemia, HTN, hypothyroidism presenting with epistaxis and generalized weakness in setting of acute on chronic anemia and advancing CKD. Generalized weakness likely 2/2 advancing CKD and symptomatic anemia. Pt also noted to have thrombocytopenia and mild transaminitis       -No sign of acute ischemia or decompensated heart failure  -Stress nuclear exam and echo normal 2017  -Monitor Qtc while on antipsychotics.    -no clinically evident arrhythmias  -edema, cont po lasix  -no left heart failure  -anemia workup  -PRBCs as needed.    -cont amlodipine and hydralazine.  -Watch electrolytes; he says he was on Kayexalate at home.  -Will follow with you.
58 yo M with PMH of bipolar 1, CKD (baseline Cr ~6 ), anemia, HTN, hypothyroidism presenting with epistaxis and generalized weakness in setting of acute on chronic anemia and advancing CKD. Generalized weakness likely 2/2 advancing CKD and symptomatic anemia. Pt also noted to have thrombocytopenia and mild transaminitis
57 year old male with h/o bipolar disorder, CKD stage V, HTN, hypothyroidism admitted for symptomatic anemia
58 yo M bipolar 1, CKD (baseline Cr ~6 ), anemia, HTN, hypothyroidism presenting with epistaxis and generalized weakness in setting of acute on chronic anemia and advancing CKD. Generalized weakness likely 2/2 advancing CKD and symptomatic anemia. Pt also noted to have thrombocytopenia and mild transaminitis       -No sign of acute ischemia or decompensated heart failure  -Stress nuclear exam and echo normal 2017  -Monitor Qtc (JT given RBBB) while on antipsychotics.  check EKG today.   -no clinically evident arrhythmias  -edema, cont po lasix. F/U with renal  -anemia workup  -PRBCs as needed.    -cont amlodipine and hydralazine.  -Watch electrolytes; he says he was on Kayexalate at home.  -Will follow with you.
56 yo M bipolar 1, CKD (baseline Cr ~6 ), anemia, HTN, hypothyroidism presenting with epistaxis and generalized weakness in setting of acute on chronic anemia and advancing CKD. Generalized weakness likely 2/2 advancing CKD and symptomatic anemia. Pt also noted to have thrombocytopenia and mild transaminitis     -No sign of acute ischemia or decompensated heart failure  -s/p BM biopsy to evaluate for plasma cell dyscrasia.  -Stress nuclear exam and echo normal 2017  -Monitor Qtc (JT given RBBB) while on antipsychotics.  -no clinically evident arrhythmias  -remains volume overloaded with edema, cont po lasix. F/U with renal, plan for HD today, so the utility of po Lasix is unclear.  -anemia workup  -PRBCs as needed.    -cont amlodipine at current dose. Bp should improve with HD. He is off hydralazine  -Will follow with you.
56 yo M with PMH of bipolar 1, CKD (baseline Cr ~6 ), anemia, HTN, hypothyroidism presenting with epistaxis and generalized weakness in setting of acute on chronic anemia and advancing CKD. Generalized weakness likely 2/2 advancing CKD and symptomatic anemia. Pt also noted to have thrombocytopenia and mild transaminitis
57 year old male with h/o bipolar disorder, CKD stage V, HTN, hypothyroidism admitted for symptomatic anemia
57M with epistaxis now stopped. s/p RBCs. No evidence for active cardiac issues.  Stree nuclear exam and echo normal 2017    Recommend:  - Proceed with RBCs  - Renal f/u  - ENT eval  - no further cardiac eval needed at present
58 yo M bipolar 1, CKD (baseline Cr ~6 ), anemia, HTN, hypothyroidism presenting with epistaxis and generalized weakness in setting of acute on chronic anemia and advancing CKD. Generalized weakness likely 2/2 advancing CKD and symptomatic anemia. Pt also noted to have thrombocytopenia and mild transaminitis   Stress nuclear exam and echo normal 2017  No sign of acute ischemia or decompensated heart failure  Monitor Qtc while on antipsychotics. <500 ms on this mornings EKG  PRBCs as needed. Watch volume status closely.  I agree with the uptitration of amlodipine and hydralazine.  Can continue lasix for now. There is some evidence of volume overload.  Renal follow up.  Watch BP closely. Watch electrolytes; he says he was on Kayexalate at home.  Will follow with you.
58 yo M with PMH of bipolar 1, CKD (baseline Cr ~6 ), anemia, HTN, hypothyroidism presenting with epistaxis and generalized weakness in setting of acute on chronic anemia and advancing CKD. Generalized weakness likely 2/2 advancing CKD and symptomatic anemia. Pt also noted to have thrombocytopenia and mild transaminitis
Anemia. Thrombocytopenia. H/o CKD, lithium toxicity. Renal failure. Renal following. Patient also with h/o MGUS, negative skeletal survey and 24 hour urine for bence perkins.  - S/p bmbx to r/o plasma cell dyscrasia as playing role in current hematologic findings.  - paraprotein levels remain relatively stable.   - monitor cbc and provide transfusional support prn.  -  skeletal survey negative    - no heme-onc objection to dc with outpatient f/u
anemia. Thrombocytopenia. H/o CKD, lithium toxicity. Renal failure. Renal following. Patient also with h/o MGUS, negative skeletal survey and 24 hour urine for bence perkins.  - needs bmbx to r/o plasma cell dyscrasia playing role in current hematologic findings. Santosh has avoided this test in the past , but now agrees to proceed. Scheduled for today  IR asking for order for ddavp to be available on call for procedure. Will d/w NP on floor  - paraprotein levels remain relatively stable.   - monitor cbc and provide transfusional support prn.  -  skeletal survey negative      -  - no heme-onc objection to dc with outpatient f/u once bmbx completed
57 year old male with h/o bipolar disorder, CKD stage V, HTN, hypothyroidism admitted for symptomatic anemia
anemia. Thrombocytopenia. H/o CKD, lithium toxicity. Renal failure. Renal following. Patient also with h/o MGUS, negative skeletal survey and 24 hour urine for bence perkins.  - needs bmbx to r/o plasma cell dyscrasia playing role in current hematologic findings. Patiajayn has avoided this test in the past , but now agrees to proceed. Scheduled for Wed.   IR asking for order for ddavp to be available on call for procedure.   -f/u paraprotein levels.   - f/u anemia w/u  - monitor cbc and provide transfusional support prn.  - suggest skeletal survey negative

## 2018-02-16 ENCOUNTER — OTHER (OUTPATIENT)
Age: 58
End: 2018-02-16

## 2018-02-16 DIAGNOSIS — N18.5 CHRONIC KIDNEY DISEASE, STAGE 5: ICD-10-CM

## 2018-02-17 LAB
HBV CORE IGG+IGM SER QL: NONREACTIVE
HBV SURFACE AB SER QL: NONREACTIVE
HBV SURFACE AG SER QL: NONREACTIVE
HCV AB SER QL: NONREACTIVE
HCV S/CO RATIO: 0.08 S/CO

## 2018-02-20 ENCOUNTER — RX RENEWAL (OUTPATIENT)
Age: 58
End: 2018-02-20

## 2018-02-20 LAB
HEMATOPATHOLOGY REPORT: SIGNIFICANT CHANGE UP
TM INTERPRETATION: SIGNIFICANT CHANGE UP

## 2018-02-20 RX ORDER — SODIUM POLYSTYRENE SULFONATE 15 G/60ML
15 SUSPENSION ORAL
Qty: 720 | Refills: 1 | Status: ACTIVE | COMMUNITY
Start: 2017-12-13 | End: 1900-01-01

## 2018-02-27 LAB — CHROM ANALY OVERALL INTERP SPEC-IMP: SIGNIFICANT CHANGE UP

## 2018-07-11 ENCOUNTER — APPOINTMENT (OUTPATIENT)
Dept: TRANSPLANT | Facility: CLINIC | Age: 58
End: 2018-07-11
Payer: COMMERCIAL

## 2018-07-11 ENCOUNTER — LABORATORY RESULT (OUTPATIENT)
Age: 58
End: 2018-07-11

## 2018-07-11 PROCEDURE — 99215 OFFICE O/P EST HI 40 MIN: CPT

## 2018-07-17 PROBLEM — E03.9 HYPOTHYROIDISM, UNSPECIFIED: Chronic | Status: ACTIVE | Noted: 2018-02-09

## 2018-07-17 LAB
ADJUSTED MITOGEN: 6.32 IU/ML
ADJUSTED TB AG: 0.01 IU/ML
ALBUMIN SERPL ELPH-MCNC: 3.9 G/DL
ALP BLD-CCNC: 79 U/L
ALT SERPL-CCNC: 30 U/L
ANION GAP SERPL CALC-SCNC: 23 MMOL/L
APPEARANCE: CLEAR
AST SERPL-CCNC: 17 U/L
BASOPHILS # BLD AUTO: 0.02 K/UL
BASOPHILS NFR BLD AUTO: 0.3 %
BILIRUB SERPL-MCNC: 0.3 MG/DL
BILIRUBIN URINE: NEGATIVE
BLOOD URINE: NEGATIVE
BUN SERPL-MCNC: 68 MG/DL
C PEPTIDE SERPL-MCNC: 16.1 NG/ML
CALCIUM SERPL-MCNC: 9.5 MG/DL
CHLORIDE SERPL-SCNC: 93 MMOL/L
CHOLEST SERPL-MCNC: 223 MG/DL
CHOLEST/HDLC SERPL: 3.4 RATIO
CK SERPL-CCNC: 146 U/L
CMV DNA SPEC QL NAA+PROBE: NOT DETECTED IU/ML
CMV IGG SERPL QL: <0.2 U/ML
CMV IGG SERPL-IMP: NEGATIVE
CMV IGM SERPL QL: <8 AU/ML
CMV IGM SERPL QL: NEGATIVE
CO2 SERPL-SCNC: 22 MMOL/L
COLOR: YELLOW
CREAT SERPL-MCNC: 6.01 MG/DL
CREAT SPEC-SCNC: 37 MG/DL
CREAT/PROT UR: 5.9 RATIO
CRP SERPL-MCNC: 1.18 MG/DL
EBV EA AB SER IA-ACNC: <5 U/ML
EBV EA AB TITR SER IF: NEGATIVE
EBV EA IGG SER QL IA: <3 U/ML
EBV EA IGG SER-ACNC: NEGATIVE
EBV EA IGM SER IA-ACNC: NEGATIVE
EBV PATRN SPEC IB-IMP: NORMAL
EBV VCA IGG SER IA-ACNC: <10 U/ML
EBV VCA IGM SER QL IA: <10 U/ML
EOSINOPHIL # BLD AUTO: 0.2 K/UL
EOSINOPHIL NFR BLD AUTO: 2.9 %
EPSTEIN-BARR VIRUS CAPSID ANTIGEN IGG: NEGATIVE
ERYTHROCYTE [SEDIMENTATION RATE] IN BLOOD BY WESTERGREN METHOD: 52 MM/HR
GLUCOSE QUALITATIVE U: 250 MG/DL
GLUCOSE SERPL-MCNC: 108 MG/DL
HBA1C MFR BLD HPLC: 5.3 %
HBV CORE IGG+IGM SER QL: NONREACTIVE
HBV SURFACE AB SER QL: NONREACTIVE
HBV SURFACE AB SERPL IA-ACNC: <3 MIU/ML
HBV SURFACE AG SER QL: NONREACTIVE
HCT VFR BLD CALC: 36.1 %
HCV AB SER QL: NONREACTIVE
HCV S/CO RATIO: 0.19 S/CO
HDLC SERPL-MCNC: 66 MG/DL
HGB BLD-MCNC: 11.8 G/DL
HIV1+2 AB SPEC QL IA.RAPID: NONREACTIVE
HSV 1+2 IGG SER IA-IMP: NEGATIVE
HSV 1+2 IGG SER IA-IMP: POSITIVE
HSV 1+2 IGG SER IA-IMP: POSITIVE
HSV1 IGG SER QL: 58.9 INDEX
HSV1 IGG SER QL: 58.9 INDEX
HSV2 IGG SER QL: 0.13 INDEX
IMM GRANULOCYTES NFR BLD AUTO: 0.3 %
KETONES URINE: NEGATIVE
LDLC SERPL CALC-MCNC: 136 MG/DL
LEUKOCYTE ESTERASE URINE: NEGATIVE
LYMPHOCYTES # BLD AUTO: 1.04 K/UL
LYMPHOCYTES NFR BLD AUTO: 15.1 %
M TB IFN-G BLD-IMP: NEGATIVE
MAGNESIUM SERPL-MCNC: 2.5 MG/DL
MAN DIFF?: NORMAL
MCHC RBC-ENTMCNC: 29.2 PG
MCHC RBC-ENTMCNC: 32.7 GM/DL
MCV RBC AUTO: 89.4 FL
MONOCYTES # BLD AUTO: 0.48 K/UL
MONOCYTES NFR BLD AUTO: 7 %
NEUTROPHILS # BLD AUTO: 5.14 K/UL
NEUTROPHILS NFR BLD AUTO: 74.4 %
NITRITE URINE: NEGATIVE
PH URINE: 7
PHOSPHATE SERPL-MCNC: 7.4 MG/DL
PLATELET # BLD AUTO: 207 K/UL
POTASSIUM SERPL-SCNC: 4.6 MMOL/L
PROT SERPL-MCNC: 8.5 G/DL
PROT UR-MCNC: 219 MG/DL
PROTEIN URINE: 300 MG/DL
PSA SERPL-MCNC: 1.51 NG/ML
QUANTIFERON GOLD NIL: 0.02 IU/ML
RBC # BLD: 4.04 M/UL
RBC # FLD: 14.9 %
RUBV IGG FLD-ACNC: 13.1 INDEX
RUBV IGG SER-IMP: POSITIVE
SODIUM SERPL-SCNC: 138 MMOL/L
SPECIFIC GRAVITY URINE: 1.01
T GONDII AB SER-IMP: NEGATIVE
T GONDII IGG SER QL: <3 IU/ML
T PALLIDUM AB SER QL IA: NEGATIVE
T3 SERPL-MCNC: 68 NG/DL
T4 FREE SERPL-MCNC: 1.8 NG/DL
TRIGL SERPL-MCNC: 103 MG/DL
TSH SERPL-ACNC: 1.87 UIU/ML
URATE SERPL-MCNC: 4.6 MG/DL
UROBILINOGEN URINE: NEGATIVE MG/DL
VZV AB TITR SER: POSITIVE
VZV IGG SER IF-ACNC: 3304 INDEX
WBC # FLD AUTO: 6.9 K/UL

## 2018-07-18 LAB
HSV1 IGM SER QL: NORMAL TITER
HSV2 AB FLD-ACNC: NORMAL TITER

## 2018-08-01 ENCOUNTER — APPOINTMENT (OUTPATIENT)
Dept: ULTRASOUND IMAGING | Facility: CLINIC | Age: 58
End: 2018-08-01

## 2018-08-01 ENCOUNTER — APPOINTMENT (OUTPATIENT)
Dept: RADIOLOGY | Facility: CLINIC | Age: 58
End: 2018-08-01

## 2018-08-08 ENCOUNTER — APPOINTMENT (OUTPATIENT)
Dept: CARDIOLOGY | Facility: CLINIC | Age: 58
End: 2018-08-08

## 2018-08-20 ENCOUNTER — APPOINTMENT (OUTPATIENT)
Dept: CARDIOLOGY | Facility: CLINIC | Age: 58
End: 2018-08-20

## 2018-08-22 ENCOUNTER — APPOINTMENT (OUTPATIENT)
Dept: PSYCHIATRY | Facility: CLINIC | Age: 58
End: 2018-08-22

## 2018-09-10 ENCOUNTER — APPOINTMENT (OUTPATIENT)
Dept: GASTROENTEROLOGY | Facility: CLINIC | Age: 58
End: 2018-09-10

## 2018-09-21 ENCOUNTER — APPOINTMENT (OUTPATIENT)
Dept: PSYCHIATRY | Facility: CLINIC | Age: 58
End: 2018-09-21
Payer: MEDICARE

## 2018-09-21 DIAGNOSIS — F29 UNSPECIFIED PSYCHOSIS NOT DUE TO A SUBSTANCE OR KNOWN PHYSIOLOGICAL CONDITION: ICD-10-CM

## 2018-09-21 DIAGNOSIS — Z80.8 FAMILY HISTORY OF MALIGNANT NEOPLASM OF OTHER ORGANS OR SYSTEMS: ICD-10-CM

## 2018-09-21 DIAGNOSIS — F20.9 SCHIZOPHRENIA, UNSPECIFIED: ICD-10-CM

## 2018-09-21 PROCEDURE — 99213 OFFICE O/P EST LOW 20 MIN: CPT

## 2018-11-30 ENCOUNTER — RX RENEWAL (OUTPATIENT)
Age: 58
End: 2018-11-30

## 2018-12-19 ENCOUNTER — RX RENEWAL (OUTPATIENT)
Age: 58
End: 2018-12-19

## 2019-02-20 ENCOUNTER — RX RENEWAL (OUTPATIENT)
Age: 59
End: 2019-02-20

## 2019-03-03 ENCOUNTER — RX RENEWAL (OUTPATIENT)
Age: 59
End: 2019-03-03

## 2019-03-10 ENCOUNTER — RX RENEWAL (OUTPATIENT)
Age: 59
End: 2019-03-10

## 2019-07-31 ENCOUNTER — APPOINTMENT (OUTPATIENT)
Dept: NEPHROLOGY | Facility: CLINIC | Age: 59
End: 2019-07-31

## 2019-08-07 ENCOUNTER — APPOINTMENT (OUTPATIENT)
Dept: NEPHROLOGY | Facility: CLINIC | Age: 59
End: 2019-08-07
Payer: MEDICARE

## 2019-08-07 VITALS
HEIGHT: 67 IN | HEART RATE: 88 BPM | BODY MASS INDEX: 31.71 KG/M2 | WEIGHT: 202 LBS | OXYGEN SATURATION: 99 % | TEMPERATURE: 98.7 F | SYSTOLIC BLOOD PRESSURE: 150 MMHG | RESPIRATION RATE: 18 BRPM | DIASTOLIC BLOOD PRESSURE: 91 MMHG

## 2019-08-07 DIAGNOSIS — F31.9 BIPOLAR DISORDER, UNSPECIFIED: ICD-10-CM

## 2019-08-07 DIAGNOSIS — Z01.818 ENCOUNTER FOR OTHER PREPROCEDURAL EXAMINATION: ICD-10-CM

## 2019-08-07 DIAGNOSIS — N18.6 END STAGE RENAL DISEASE: ICD-10-CM

## 2019-08-07 DIAGNOSIS — Z99.2 END STAGE RENAL DISEASE: ICD-10-CM

## 2019-08-07 DIAGNOSIS — I10 ESSENTIAL (PRIMARY) HYPERTENSION: ICD-10-CM

## 2019-08-07 PROCEDURE — 99214 OFFICE O/P EST MOD 30 MIN: CPT

## 2019-08-08 LAB
ABO + RH PNL BLD: NORMAL
ALBUMIN SERPL ELPH-MCNC: 4.3 G/DL
ALP BLD-CCNC: 118 U/L
ALT SERPL-CCNC: 17 U/L
ANION GAP SERPL CALC-SCNC: 16 MMOL/L
APPEARANCE: CLEAR
AST SERPL-CCNC: 10 U/L
BACTERIA: NEGATIVE
BASOPHILS # BLD AUTO: 0.04 K/UL
BASOPHILS NFR BLD AUTO: 0.7 %
BILIRUB SERPL-MCNC: 0.3 MG/DL
BILIRUBIN URINE: NEGATIVE
BLOOD URINE: NORMAL
BUN SERPL-MCNC: 53 MG/DL
C PEPTIDE SERPL-MCNC: 9.3 NG/ML
CALCIUM SERPL-MCNC: 11 MG/DL
CHLORIDE SERPL-SCNC: 98 MMOL/L
CMV IGG SERPL QL: <0.2 U/ML
CMV IGG SERPL-IMP: NEGATIVE
CO2 SERPL-SCNC: 22 MMOL/L
COLOR: NORMAL
CREAT SERPL-MCNC: 7.08 MG/DL
CREAT SPEC-SCNC: 66 MG/DL
CREAT/PROT UR: 4.9 RATIO
EBV EA AB SER IA-ACNC: <5 U/ML
EBV EA AB TITR SER IF: NEGATIVE
EBV EA IGG SER QL IA: <3 U/ML
EBV EA IGG SER-ACNC: NEGATIVE
EBV EA IGM SER IA-ACNC: NEGATIVE
EBV PATRN SPEC IB-IMP: NORMAL
EBV VCA IGG SER IA-ACNC: <10 U/ML
EBV VCA IGM SER QL IA: <10 U/ML
EOSINOPHIL # BLD AUTO: 0.11 K/UL
EOSINOPHIL NFR BLD AUTO: 2 %
EPSTEIN-BARR VIRUS CAPSID ANTIGEN IGG: NEGATIVE
ESTIMATED AVERAGE GLUCOSE: 88 MG/DL
GLUCOSE QUALITATIVE U: ABNORMAL
GLUCOSE SERPL-MCNC: 100 MG/DL
HAV IGM SER QL: NONREACTIVE
HBA1C MFR BLD HPLC: 4.7 %
HBV CORE IGG+IGM SER QL: NONREACTIVE
HBV SURFACE AB SER QL: NONREACTIVE
HBV SURFACE AG SER QL: NONREACTIVE
HCT VFR BLD CALC: 38.9 %
HCV AB SER QL: NONREACTIVE
HCV S/CO RATIO: 0.15 S/CO
HGB BLD-MCNC: 12.1 G/DL
HIV1+2 AB SPEC QL IA.RAPID: NONREACTIVE
HSV 1+2 IGG SER IA-IMP: NEGATIVE
HSV 1+2 IGG SER IA-IMP: POSITIVE
HSV1 IGG SER QL: >62.2 INDEX
HSV2 IGG SER QL: 0.23 INDEX
HYALINE CASTS: 0 /LPF
IMM GRANULOCYTES NFR BLD AUTO: 0.4 %
KETONES URINE: NEGATIVE
LEUKOCYTE ESTERASE URINE: NEGATIVE
LYMPHOCYTES # BLD AUTO: 0.85 K/UL
LYMPHOCYTES NFR BLD AUTO: 15.3 %
MAGNESIUM SERPL-MCNC: 2.7 MG/DL
MAN DIFF?: NORMAL
MCHC RBC-ENTMCNC: 30.5 PG
MCHC RBC-ENTMCNC: 31.1 GM/DL
MCV RBC AUTO: 98 FL
MICROSCOPIC-UA: NORMAL
MONOCYTES # BLD AUTO: 0.39 K/UL
MONOCYTES NFR BLD AUTO: 7 %
NEUTROPHILS # BLD AUTO: 4.15 K/UL
NEUTROPHILS NFR BLD AUTO: 74.6 %
NITRITE URINE: NEGATIVE
PH URINE: 7
PHOSPHATE SERPL-MCNC: 6.7 MG/DL
PLATELET # BLD AUTO: 231 K/UL
POTASSIUM SERPL-SCNC: 4.9 MMOL/L
PROT SERPL-MCNC: 8.1 G/DL
PROT UR-MCNC: 324 MG/DL
PROTEIN URINE: ABNORMAL
PSA SERPL-MCNC: 2.41 NG/ML
RBC # BLD: 3.97 M/UL
RBC # FLD: 14.6 %
RED BLOOD CELLS URINE: 1 /HPF
RUBV IGG FLD-ACNC: 16.8 INDEX
RUBV IGG SER-IMP: POSITIVE
SODIUM SERPL-SCNC: 136 MMOL/L
SPECIFIC GRAVITY URINE: 1.01
SQUAMOUS EPITHELIAL CELLS: 1 /HPF
T GONDII AB SER-IMP: NEGATIVE
T GONDII IGG SER QL: <3 IU/ML
T PALLIDUM AB SER QL IA: NEGATIVE
URATE SERPL-MCNC: 5 MG/DL
UROBILINOGEN URINE: NORMAL
VZV AB TITR SER: POSITIVE
VZV IGG SER IF-ACNC: 1989 INDEX
WBC # FLD AUTO: 5.56 K/UL
WHITE BLOOD CELLS URINE: 4 /HPF

## 2019-08-09 PROBLEM — N18.6 END STAGE RENAL DISEASE ON DIALYSIS: Status: ACTIVE | Noted: 2019-08-09

## 2019-08-09 PROBLEM — Z01.818 PRE-TRANSPLANT EVALUATION FOR KIDNEY TRANSPLANT: Status: ACTIVE | Noted: 2017-03-28

## 2019-08-09 PROBLEM — F31.9 BIPOLAR DISORDER: Status: ACTIVE | Noted: 2017-03-28

## 2019-08-09 NOTE — PHYSICAL EXAM
[General Appearance - Alert] : alert [General Appearance - In No Acute Distress] : in no acute distress [Sclera] : the sclera and conjunctiva were normal [Outer Ear] : the ears and nose were normal in appearance [Jugular Venous Distention Increased] : there was no jugular-venous distention [Heart Sounds Gallop] : no gallops [Auscultation Breath Sounds / Voice Sounds] : lungs were clear to auscultation bilaterally [FreeTextEntry1] : varicose vein noted on lower extremities [Heart Sounds Pericardial Friction Rub] : no pericardial rub [Abdomen Soft] : soft [Abdomen Tenderness] : non-tender [Cervical Lymph Nodes Enlarged Posterior Bilaterally] : posterior cervical [Cervical Lymph Nodes Enlarged Anterior Bilaterally] : anterior cervical [Supraclavicular Lymph Nodes Enlarged Bilaterally] : supraclavicular [Axillary Lymph Nodes Enlarged Bilaterally] : axillary [Inguinal Lymph Nodes Enlarged Bilaterally] : inguinal [___ (cm) Fistula] : [unfilled] (cm) fistula [] : no rash [Bruit] : a bruit was present [Thrill] : a thrill was present [No Focal Deficits] : no focal deficits [Mood] : the mood was normal [Oriented To Time, Place, And Person] : oriented to person, place, and time

## 2019-08-09 NOTE — HISTORY OF PRESENT ILLNESS
[FreeTextEntry1] : 58 years old  male with ESRD on hemodialysis 2018, CKD, related to Lithium use dating from 30 years ago for schizo affective disorder, currently off. Has no recent hospitalization for psych issues and he lives independently and drives.\par He is accompanied by his sister and mother. He helps his mother who lives near by for her chores.\par He has h/o hypertension and hypothyroidism, no known h/o CAD/CVA/PAD; has h/o varicose veins which he says runs in the family.\par Non smoker.\par No transfusions.\par Reports no acute symptoms at present.\par He is independent for ADL and is not working.\par Able to walk >2 blocks and climb stairs.\par No potential live donors at present.

## 2019-08-09 NOTE — REASON FOR VISIT
[Follow-Up] : a follow-up visit [Parent] : parent [Family Member] : family member [FreeTextEntry1] : Pre kidney transplant evaluation follow up

## 2019-08-09 NOTE — END OF VISIT
[>50% of Time Spent on Counseling for ____] : Greater than 50% of the encounter time was spent on counseling for [unfilled] [Time Spent: ___ minutes] : I have spent [unfilled] minutes of face to face time with the patient [FreeTextEntry1] : FLO Knight

## 2019-08-09 NOTE — ASSESSMENT
[FreeTextEntry1] : ESRD: Clinically acceptable as a candidate for transplantation, will update studies as per protocol. No definite live donors at present.\par Cardiac eval: Needs updated CAD work up, echo, stress test\par Cancer screen: Colon health screen- noted colonoscopy reports, PSA\par ID: Serologies for viral infections and latent TB evaluation\par Imaging: chest, abd/pelvis imaging\par Consults: cardiology, psych, social work evaluations\par Discussed benefits of live donor transplant, process of listing for  donor kidneys, variability and current wait times for  donor kidneys, KDPI >85% and [facility] high risk kidneys.\par Follow up yearly until transplanted or if any major changes in health.\par

## 2019-08-12 LAB
EBV DNA SERPL NAA+PROBE-ACNC: NOT DETECTED IU/ML
EBVPCR LOG: NOT DETECTED LOGIU/ML
M TB IFN-G BLD-IMP: NEGATIVE
QUANTIFERON TB PLUS MITOGEN MINUS NIL: 1.98 IU/ML
QUANTIFERON TB PLUS NIL: 0.01 IU/ML
QUANTIFERON TB PLUS TB1 MINUS NIL: 0 IU/ML
QUANTIFERON TB PLUS TB2 MINUS NIL: 0 IU/ML

## 2019-08-13 ENCOUNTER — APPOINTMENT (OUTPATIENT)
Dept: NEPHROLOGY | Facility: CLINIC | Age: 59
End: 2019-08-13

## 2019-08-13 ENCOUNTER — APPOINTMENT (OUTPATIENT)
Dept: TRANSPLANT | Facility: CLINIC | Age: 59
End: 2019-08-13

## 2019-10-16 ENCOUNTER — APPOINTMENT (OUTPATIENT)
Dept: CARDIOLOGY | Facility: CLINIC | Age: 59
End: 2019-10-16

## 2020-01-03 ENCOUNTER — EMERGENCY (EMERGENCY)
Facility: HOSPITAL | Age: 60
LOS: 0 days | Discharge: HOME | End: 2020-01-03
Attending: EMERGENCY MEDICINE
Payer: MEDICARE

## 2020-01-03 VITALS
HEART RATE: 84 BPM | OXYGEN SATURATION: 96 % | TEMPERATURE: 99 F | SYSTOLIC BLOOD PRESSURE: 155 MMHG | WEIGHT: 179.9 LBS | DIASTOLIC BLOOD PRESSURE: 96 MMHG | RESPIRATION RATE: 16 BRPM | HEIGHT: 67 IN

## 2020-01-03 VITALS
SYSTOLIC BLOOD PRESSURE: 157 MMHG | RESPIRATION RATE: 16 BRPM | DIASTOLIC BLOOD PRESSURE: 72 MMHG | TEMPERATURE: 99 F | HEART RATE: 88 BPM | OXYGEN SATURATION: 98 %

## 2020-01-03 DIAGNOSIS — R51 HEADACHE: ICD-10-CM

## 2020-01-03 DIAGNOSIS — Z79.899 OTHER LONG TERM (CURRENT) DRUG THERAPY: ICD-10-CM

## 2020-01-03 DIAGNOSIS — I12.0 HYPERTENSIVE CHRONIC KIDNEY DISEASE WITH STAGE 5 CHRONIC KIDNEY DISEASE OR END STAGE RENAL DISEASE: ICD-10-CM

## 2020-01-03 DIAGNOSIS — D64.9 ANEMIA, UNSPECIFIED: ICD-10-CM

## 2020-01-03 DIAGNOSIS — Z99.2 DEPENDENCE ON RENAL DIALYSIS: ICD-10-CM

## 2020-01-03 DIAGNOSIS — I10 ESSENTIAL (PRIMARY) HYPERTENSION: ICD-10-CM

## 2020-01-03 DIAGNOSIS — E03.9 HYPOTHYROIDISM, UNSPECIFIED: ICD-10-CM

## 2020-01-03 DIAGNOSIS — F31.9 BIPOLAR DISORDER, UNSPECIFIED: ICD-10-CM

## 2020-01-03 DIAGNOSIS — N18.6 END STAGE RENAL DISEASE: ICD-10-CM

## 2020-01-03 DIAGNOSIS — G44.89 OTHER HEADACHE SYNDROME: ICD-10-CM

## 2020-01-03 PROCEDURE — 99284 EMERGENCY DEPT VISIT MOD MDM: CPT

## 2020-01-03 PROCEDURE — 93010 ELECTROCARDIOGRAM REPORT: CPT

## 2020-01-03 PROCEDURE — 70450 CT HEAD/BRAIN W/O DYE: CPT | Mod: 26

## 2020-01-03 RX ORDER — DOXAZOSIN MESYLATE 4 MG
1 TABLET ORAL
Qty: 0 | Refills: 0 | DISCHARGE

## 2020-01-03 RX ORDER — CHLORPROMAZINE HCL 10 MG
1 TABLET ORAL
Qty: 0 | Refills: 0 | DISCHARGE

## 2020-01-03 RX ORDER — ARIPIPRAZOLE 15 MG/1
1 TABLET ORAL
Qty: 0 | Refills: 0 | DISCHARGE

## 2020-01-03 RX ORDER — SODIUM POLYSTYRENE SULFONATE 4.1 MEQ/G
60 POWDER, FOR SUSPENSION ORAL
Qty: 0 | Refills: 0 | DISCHARGE

## 2020-01-03 RX ORDER — ACETAMINOPHEN 500 MG
975 TABLET ORAL ONCE
Refills: 0 | Status: COMPLETED | OUTPATIENT
Start: 2020-01-03 | End: 2020-01-03

## 2020-01-03 RX ORDER — LEVOTHYROXINE SODIUM 125 MCG
1 TABLET ORAL
Qty: 0 | Refills: 0 | DISCHARGE

## 2020-01-03 RX ORDER — CHLORPROMAZINE HCL 10 MG
1.5 TABLET ORAL
Qty: 0 | Refills: 0 | DISCHARGE

## 2020-01-03 RX ORDER — KETOROLAC TROMETHAMINE 30 MG/ML
30 SYRINGE (ML) INJECTION ONCE
Refills: 0 | Status: DISCONTINUED | OUTPATIENT
Start: 2020-01-03 | End: 2020-01-03

## 2020-01-03 RX ORDER — AMLODIPINE BESYLATE 2.5 MG/1
1 TABLET ORAL
Qty: 0 | Refills: 0 | DISCHARGE

## 2020-01-03 RX ORDER — SODIUM BICARBONATE 1 MEQ/ML
2 SYRINGE (ML) INTRAVENOUS
Qty: 0 | Refills: 0 | DISCHARGE

## 2020-01-03 RX ORDER — CALCITRIOL 0.5 UG/1
1 CAPSULE ORAL
Qty: 0 | Refills: 0 | DISCHARGE

## 2020-01-03 RX ORDER — CINACALCET 30 MG/1
1 TABLET, FILM COATED ORAL
Qty: 0 | Refills: 0 | DISCHARGE

## 2020-01-03 RX ORDER — BENZOYL PEROXIDE MICRONIZED 5.8 %
2 TOWELETTE (EA) TOPICAL
Qty: 0 | Refills: 0 | DISCHARGE

## 2020-01-03 RX ORDER — METOCLOPRAMIDE HCL 10 MG
10 TABLET ORAL ONCE
Refills: 0 | Status: COMPLETED | OUTPATIENT
Start: 2020-01-03 | End: 2020-01-03

## 2020-01-03 RX ADMIN — Medication 975 MILLIGRAM(S): at 00:51

## 2020-01-03 RX ADMIN — Medication 975 MILLIGRAM(S): at 02:16

## 2020-01-03 RX ADMIN — Medication 10 MILLIGRAM(S): at 00:51

## 2020-01-03 RX ADMIN — Medication 30 MILLIGRAM(S): at 03:17

## 2020-01-03 NOTE — ED ADULT NURSE NOTE - CHPI ED NUR SYMPTOMS NEG
no change in level of consciousness/no confusion/no vomiting/no fever/no nausea/no blurred vision/no loss of consciousness/no dizziness/no numbness/no weakness

## 2020-01-03 NOTE — ED ADULT NURSE NOTE - OBJECTIVE STATEMENT
Patient reports headache for 2 weeks, worse after dialysis. Dialysis T-Th-sat.  Denies injury or trauma. Denies photophobia. Denies Nausea or vomiting

## 2020-01-03 NOTE — ED PROVIDER NOTE - CARE PLAN
Principal Discharge DX:	Chronic nonintractable headache, unspecified headache type  Secondary Diagnosis:	Bipolar 1 disorder

## 2020-01-03 NOTE — ED PROVIDER NOTE - CLINICAL SUMMARY MEDICAL DECISION MAKING FREE TEXT BOX
Ddx: No sudden onset of headache to suggest sah/ no nuchal rigidity to suggest meningitis  Plan: CT head, tylenol, reglan, reassess

## 2020-01-03 NOTE — ED PROVIDER NOTE - PROGRESS NOTE DETAILS
Pt is feeling better, CT head unremarkable, pt has appt w neurologist tomorrow, ready and asking for d/c.

## 2020-01-03 NOTE — ED PROVIDER NOTE - OBJECTIVE STATEMENT
Pt is a 60 yo gentleman with a pmhx of ESRD on HD, HTN, bipolar who presents to the ED with headache. Has had a headache for the past couple weeks. Is intermittent. Is on the top of his head. No fevers, no sudden onset, not worse with lights and sounds. No trauma. No n/v/d. No nuchal rigidity.

## 2020-01-03 NOTE — ED ADULT NURSE NOTE - NSIMPLEMENTINTERV_GEN_ALL_ED
Implemented All Universal Safety Interventions:  [place] to call system. Call bell, personal items and telephone within reach. Instruct patient to call for assistance. Room bathroom lighting operational. Non-slip footwear when patient is off stretcher. Physically safe environment: no spills, clutter or unnecessary equipment. Stretcher in lowest position, wheels locked, appropriate side rails in place.

## 2020-01-03 NOTE — ED ADULT TRIAGE NOTE - CHIEF COMPLAINT QUOTE
as per ems from home, pounding headache right side x three weeks 180/80 Hx HTN,  dialysis Tues, thurs, saturday completed four hours today. amlodipine, aripoprazole, levothyroxine, metoprolol, doxyzosine, benztropine, chlorpromazine. L- AV fistula. c/o intermittent blurry vision, denies nausea, as per ems congestion and cough

## 2020-01-03 NOTE — ED PROVIDER NOTE - PATIENT PORTAL LINK FT
You can access the FollowMyHealth Patient Portal offered by [facility] by registering at the following website: [email or web address]. By joining [facility]’s FollowMyHealth portal, you will also be able to view your health information using other applications (apps) compatible with our system.

## 2020-06-03 NOTE — H&P ADULT - GENERAL
Rx Request  HYDROCHLOROTHIAZIDE 12.5 MG Oral Tab    Disp:    90                R: 0    Last Visit: 03/13/2019    Last Refilled: 03/09/2020    Protocol Passed?  Yes[  ]       No[ x ]
details…

## 2021-01-08 NOTE — ED ADULT NURSE NOTE - NS_SISCREENINGSR_GEN_ALL_ED
Pt arrives to triage with mother who consents to treat. Pt's mother states that she wants pt to be tested for COVID. Pt also reports dysuria and suprapubic abdominal pain which he states has been ongoing for the past several months. Pt denies hematuria.   
Negative

## 2023-05-12 NOTE — ED PROVIDER NOTE - MUSCULOSKELETAL, MLM
<<--- Click to launch Spine appears normal, range of motion is not limited, no muscle or joint tenderness

## 2025-01-31 NOTE — ED PROVIDER NOTE - CONSTITUTIONAL NEGATIVE STATEMENT, MLM
H/o HLD on Atorvastatin   - Per wife at bedside pt stated that pt stopped taking medications at home b/c he does not want to take these medications anymore  - Pt and wife agreeable to comfort measures H/o HLD on Atorvastatin   - Per wife at bedside pt stated that pt stopped taking medications at home b/c he does not want to take these medications anymore  - Pt and wife agreeable to comfort measures H/o HLD on Atorvastatin   - Per wife at bedside pt stated that pt stopped taking medications at home b/c he does not want to take these medications anymore  - Pt and wife agreeable to comfort measures H/o HLD on Atorvastatin   - Per wife at bedside pt stated that pt stopped taking medications at home b/c he does not want to take these medications anymore  - Pt and wife agreeable to comfort measures no fever and no chills.

## 2025-06-13 NOTE — H&P ADULT - ENT GEN HX ROS MEA POS PC
The patient's goals for the shift include      The clinical goals for the shift include tolerate ambulation    Over the shift, the patient did not make progress toward the following goals. Barriers to progression include n/a. Recommendations to address these barriers include n/a.     epistaxis